# Patient Record
Sex: FEMALE | URBAN - METROPOLITAN AREA
[De-identification: names, ages, dates, MRNs, and addresses within clinical notes are randomized per-mention and may not be internally consistent; named-entity substitution may affect disease eponyms.]

---

## 2022-01-19 ENCOUNTER — INPATIENT (INPATIENT)
Facility: HOSPITAL | Age: 72
LOS: 13 days | Discharge: NURSING, REHAB OR LONG-TERM CARE | DRG: 871 | End: 2022-02-02
Attending: HOSPITALIST | Admitting: HOSPITALIST
Payer: COMMERCIAL

## 2022-01-19 VITALS
DIASTOLIC BLOOD PRESSURE: 83 MMHG | OXYGEN SATURATION: 93 % | RESPIRATION RATE: 18 BRPM | HEART RATE: 100 BPM | SYSTOLIC BLOOD PRESSURE: 121 MMHG | TEMPERATURE: 95 F

## 2022-01-19 PROCEDURE — 99291 CRITICAL CARE FIRST HOUR: CPT

## 2022-01-19 NOTE — ED PROVIDER NOTE - CARE PLAN
1 Principal Discharge DX:	COVID-19  Secondary Diagnosis:	UTI (urinary tract infection)  Secondary Diagnosis:	Thoracic aortic aneurysm

## 2022-01-19 NOTE — ED PROVIDER NOTE - PHYSICAL EXAMINATION
I have reviewed the triage vital signs.  Const: AAOx1, unkempt  Eyes: no conjunctival injection  HENT: NCAT, Neck supple, dry mucous membranes  CV: RRR, +S1, S2  Resp: CTAB, no respiratory distress  GI: Abdomen soft, NTND, no guarding  Extremities: No peripheral edema  Skin: Warm, well perfused, no rash  MSK: LLE externally rotated. BLE TTP  Neuro: No focal sensory or motor deficits  Psych: Appropriate mood and affect

## 2022-01-19 NOTE — ED PROVIDER NOTE - OBJECTIVE STATEMENT
71F transferred from [facility] for "symptomatic AAA." Pt awake, but very limited historian A&Ox1 (self). As per chart review, pt found down by HHA after being unable to contact her for 4 days. At OSH, found to have 5cm thoracic aneurysm at aortic arch, also with mild in the aschending/descending thoracic region, but study was limited due to extravasation of contrast to RUE. CT also showing BL staghorn calculus with possible hydro, given Zosyn x1, ALDA, elevated troponin, and COVID+. As per EMS, pt with VSS.

## 2022-01-19 NOTE — ED PROVIDER NOTE - PROGRESS NOTE DETAILS
Jarocho, PGY3 - Pt 92% on RA, does not appear in respiratory distress. Placed on 2L NC with improvement, will order for Decadron. Jarocho, PGY3 - CT reviewed by radiologist resident/attg, states no significant hydro, no staghorn calculi visualized (states possibly washed out by contrast, but would be intrarenal). Mildly elevated Cr. Will def urology dann d/w hospitalist who agrees

## 2022-01-19 NOTE — ED PROVIDER NOTE - CLINICAL SUMMARY MEDICAL DECISION MAKING FREE TEXT BOX
Dr. Barlow-Alba's Note: Pt transferred from OSH due to assess for possible aortic dissection - on non-con CT at osh (iv inflitrated) there is a dilated aortic root to ~6 cm, unknown if chronic vs acute. pt denying cp/sob, complaining of dizziness, but unreliable historian due to baseline dementia. on arrival pt in NAD, normotensive, HR ~100, breathing comfortably, not hypoxic, no obvious exam abnormalities. CT surgery aware of pt and recommend CTA to eval for dissection but if neg recommend admission to medicine. will also assess for any other acute medical process that may need to be treated

## 2022-01-20 DIAGNOSIS — R41.82 ALTERED MENTAL STATUS, UNSPECIFIED: ICD-10-CM

## 2022-01-20 DIAGNOSIS — E83.52 HYPERCALCEMIA: ICD-10-CM

## 2022-01-20 DIAGNOSIS — Z29.9 ENCOUNTER FOR PROPHYLACTIC MEASURES, UNSPECIFIED: ICD-10-CM

## 2022-01-20 DIAGNOSIS — I71.2 THORACIC AORTIC ANEURYSM, WITHOUT RUPTURE: ICD-10-CM

## 2022-01-20 DIAGNOSIS — N39.0 URINARY TRACT INFECTION, SITE NOT SPECIFIED: ICD-10-CM

## 2022-01-20 DIAGNOSIS — U07.1 COVID-19: ICD-10-CM

## 2022-01-20 DIAGNOSIS — R77.8 OTHER SPECIFIED ABNORMALITIES OF PLASMA PROTEINS: ICD-10-CM

## 2022-01-20 DIAGNOSIS — Z78.9 OTHER SPECIFIED HEALTH STATUS: Chronic | ICD-10-CM

## 2022-01-20 DIAGNOSIS — N17.9 ACUTE KIDNEY FAILURE, UNSPECIFIED: ICD-10-CM

## 2022-01-20 DIAGNOSIS — I71.4 ABDOMINAL AORTIC ANEURYSM, WITHOUT RUPTURE: ICD-10-CM

## 2022-01-20 LAB
ALBUMIN SERPL ELPH-MCNC: 3.8 G/DL — SIGNIFICANT CHANGE UP (ref 3.3–5)
ALBUMIN SERPL ELPH-MCNC: 4.1 G/DL — SIGNIFICANT CHANGE UP (ref 3.3–5)
ALP SERPL-CCNC: 79 U/L — SIGNIFICANT CHANGE UP (ref 40–120)
ALP SERPL-CCNC: 89 U/L — SIGNIFICANT CHANGE UP (ref 40–120)
ALT FLD-CCNC: 13 U/L — SIGNIFICANT CHANGE UP (ref 10–45)
ALT FLD-CCNC: 14 U/L — SIGNIFICANT CHANGE UP (ref 10–45)
ANION GAP SERPL CALC-SCNC: 11 MMOL/L — SIGNIFICANT CHANGE UP (ref 5–17)
ANION GAP SERPL CALC-SCNC: 17 MMOL/L — SIGNIFICANT CHANGE UP (ref 5–17)
APPEARANCE UR: ABNORMAL
APTT BLD: 25.5 SEC — LOW (ref 27.5–35.5)
AST SERPL-CCNC: 27 U/L — SIGNIFICANT CHANGE UP (ref 10–40)
AST SERPL-CCNC: 33 U/L — SIGNIFICANT CHANGE UP (ref 10–40)
BACTERIA # UR AUTO: ABNORMAL
BASE EXCESS BLDV CALC-SCNC: 1.5 MMOL/L — SIGNIFICANT CHANGE UP (ref -2–2)
BASE EXCESS BLDV CALC-SCNC: 4.9 MMOL/L — HIGH (ref -2–2)
BASOPHILS # BLD AUTO: 0.02 K/UL — SIGNIFICANT CHANGE UP (ref 0–0.2)
BASOPHILS # BLD AUTO: 0.03 K/UL — SIGNIFICANT CHANGE UP (ref 0–0.2)
BASOPHILS NFR BLD AUTO: 0.2 % — SIGNIFICANT CHANGE UP (ref 0–2)
BASOPHILS NFR BLD AUTO: 0.3 % — SIGNIFICANT CHANGE UP (ref 0–2)
BILIRUB SERPL-MCNC: 0.3 MG/DL — SIGNIFICANT CHANGE UP (ref 0.2–1.2)
BILIRUB SERPL-MCNC: 0.4 MG/DL — SIGNIFICANT CHANGE UP (ref 0.2–1.2)
BILIRUB UR-MCNC: NEGATIVE — SIGNIFICANT CHANGE UP
BUN SERPL-MCNC: 55 MG/DL — HIGH (ref 7–23)
BUN SERPL-MCNC: 59 MG/DL — HIGH (ref 7–23)
CA-I SERPL-SCNC: 1.51 MMOL/L — HIGH (ref 1.15–1.33)
CA-I SERPL-SCNC: 1.53 MMOL/L — HIGH (ref 1.15–1.33)
CALCIUM SERPL-MCNC: 11.4 MG/DL — HIGH (ref 8.4–10.5)
CALCIUM SERPL-MCNC: 12 MG/DL — HIGH (ref 8.4–10.5)
CHLORIDE BLDV-SCNC: 101 MMOL/L — SIGNIFICANT CHANGE UP (ref 96–108)
CHLORIDE BLDV-SCNC: 104 MMOL/L — SIGNIFICANT CHANGE UP (ref 96–108)
CHLORIDE SERPL-SCNC: 100 MMOL/L — SIGNIFICANT CHANGE UP (ref 96–108)
CHLORIDE SERPL-SCNC: 103 MMOL/L — SIGNIFICANT CHANGE UP (ref 96–108)
CK MB BLD-MCNC: 1.2 % — SIGNIFICANT CHANGE UP (ref 0–3.5)
CK MB CFR SERPL CALC: 6.4 NG/ML — HIGH (ref 0–3.8)
CK SERPL-CCNC: 531 U/L — HIGH (ref 25–170)
CO2 BLDV-SCNC: 31 MMOL/L — HIGH (ref 22–26)
CO2 BLDV-SCNC: 34 MMOL/L — HIGH (ref 22–26)
CO2 SERPL-SCNC: 23 MMOL/L — SIGNIFICANT CHANGE UP (ref 22–31)
CO2 SERPL-SCNC: 26 MMOL/L — SIGNIFICANT CHANGE UP (ref 22–31)
COLOR SPEC: ABNORMAL
CREAT SERPL-MCNC: 1.45 MG/DL — HIGH (ref 0.5–1.3)
CREAT SERPL-MCNC: 1.5 MG/DL — HIGH (ref 0.5–1.3)
DIFF PNL FLD: ABNORMAL
EOSINOPHIL # BLD AUTO: 0 K/UL — SIGNIFICANT CHANGE UP (ref 0–0.5)
EOSINOPHIL # BLD AUTO: 0 K/UL — SIGNIFICANT CHANGE UP (ref 0–0.5)
EOSINOPHIL NFR BLD AUTO: 0 % — SIGNIFICANT CHANGE UP (ref 0–6)
EOSINOPHIL NFR BLD AUTO: 0 % — SIGNIFICANT CHANGE UP (ref 0–6)
EPI CELLS # UR: 2 — SIGNIFICANT CHANGE UP
GAS PNL BLDV: 140 MMOL/L — SIGNIFICANT CHANGE UP (ref 136–145)
GAS PNL BLDV: 141 MMOL/L — SIGNIFICANT CHANGE UP (ref 136–145)
GAS PNL BLDV: SIGNIFICANT CHANGE UP
GLUCOSE BLDC GLUCOMTR-MCNC: 182 MG/DL — HIGH (ref 70–99)
GLUCOSE BLDC GLUCOMTR-MCNC: 219 MG/DL — HIGH (ref 70–99)
GLUCOSE BLDV-MCNC: 143 MG/DL — HIGH (ref 70–99)
GLUCOSE BLDV-MCNC: 210 MG/DL — HIGH (ref 70–99)
GLUCOSE SERPL-MCNC: 145 MG/DL — HIGH (ref 70–99)
GLUCOSE SERPL-MCNC: 200 MG/DL — HIGH (ref 70–99)
GLUCOSE UR QL: NEGATIVE — SIGNIFICANT CHANGE UP
GRAN CASTS # UR COMP ASSIST: 1 /LPF — SIGNIFICANT CHANGE UP
HCO3 BLDV-SCNC: 30 MMOL/L — HIGH (ref 22–29)
HCO3 BLDV-SCNC: 32 MMOL/L — HIGH (ref 22–29)
HCT VFR BLD CALC: 46.1 % — HIGH (ref 34.5–45)
HCT VFR BLD CALC: 48.5 % — HIGH (ref 34.5–45)
HCT VFR BLDA CALC: 44 % — SIGNIFICANT CHANGE UP (ref 34.5–46.5)
HCT VFR BLDA CALC: 45 % — SIGNIFICANT CHANGE UP (ref 34.5–46.5)
HGB BLD CALC-MCNC: 14.7 G/DL — SIGNIFICANT CHANGE UP (ref 11.7–16.1)
HGB BLD CALC-MCNC: 14.9 G/DL — SIGNIFICANT CHANGE UP (ref 11.7–16.1)
HGB BLD-MCNC: 14.5 G/DL — SIGNIFICANT CHANGE UP (ref 11.5–15.5)
HGB BLD-MCNC: 15.5 G/DL — SIGNIFICANT CHANGE UP (ref 11.5–15.5)
HYALINE CASTS # UR AUTO: 4 /LPF — SIGNIFICANT CHANGE UP (ref 0–7)
IMM GRANULOCYTES NFR BLD AUTO: 0.3 % — SIGNIFICANT CHANGE UP (ref 0–1.5)
IMM GRANULOCYTES NFR BLD AUTO: 0.3 % — SIGNIFICANT CHANGE UP (ref 0–1.5)
INR BLD: 0.97 RATIO — SIGNIFICANT CHANGE UP (ref 0.88–1.16)
KETONES UR-MCNC: NEGATIVE — SIGNIFICANT CHANGE UP
LACTATE BLDV-MCNC: 2.1 MMOL/L — HIGH (ref 0.7–2)
LACTATE BLDV-MCNC: 3.5 MMOL/L — HIGH (ref 0.7–2)
LEUKOCYTE ESTERASE UR-ACNC: ABNORMAL
LYMPHOCYTES # BLD AUTO: 1.27 K/UL — SIGNIFICANT CHANGE UP (ref 1–3.3)
LYMPHOCYTES # BLD AUTO: 1.31 K/UL — SIGNIFICANT CHANGE UP (ref 1–3.3)
LYMPHOCYTES # BLD AUTO: 13.1 % — SIGNIFICANT CHANGE UP (ref 13–44)
LYMPHOCYTES # BLD AUTO: 14 % — SIGNIFICANT CHANGE UP (ref 13–44)
MCHC RBC-ENTMCNC: 29.4 PG — SIGNIFICANT CHANGE UP (ref 27–34)
MCHC RBC-ENTMCNC: 29.5 PG — SIGNIFICANT CHANGE UP (ref 27–34)
MCHC RBC-ENTMCNC: 31.5 GM/DL — LOW (ref 32–36)
MCHC RBC-ENTMCNC: 32 GM/DL — SIGNIFICANT CHANGE UP (ref 32–36)
MCV RBC AUTO: 91.9 FL — SIGNIFICANT CHANGE UP (ref 80–100)
MCV RBC AUTO: 93.7 FL — SIGNIFICANT CHANGE UP (ref 80–100)
MONOCYTES # BLD AUTO: 0.32 K/UL — SIGNIFICANT CHANGE UP (ref 0–0.9)
MONOCYTES # BLD AUTO: 0.9 K/UL — SIGNIFICANT CHANGE UP (ref 0–0.9)
MONOCYTES NFR BLD AUTO: 3.4 % — SIGNIFICANT CHANGE UP (ref 2–14)
MONOCYTES NFR BLD AUTO: 9.3 % — SIGNIFICANT CHANGE UP (ref 2–14)
NEUTROPHILS # BLD AUTO: 7.44 K/UL — HIGH (ref 1.8–7.4)
NEUTROPHILS # BLD AUTO: 7.71 K/UL — HIGH (ref 1.8–7.4)
NEUTROPHILS NFR BLD AUTO: 77 % — SIGNIFICANT CHANGE UP (ref 43–77)
NEUTROPHILS NFR BLD AUTO: 82.1 % — HIGH (ref 43–77)
NITRITE UR-MCNC: POSITIVE
NRBC # BLD: 0 /100 WBCS — SIGNIFICANT CHANGE UP (ref 0–0)
NRBC # BLD: 0 /100 WBCS — SIGNIFICANT CHANGE UP (ref 0–0)
NT-PROBNP SERPL-SCNC: 1114 PG/ML — HIGH (ref 0–300)
PCO2 BLDV: 57 MMHG — HIGH (ref 39–42)
PCO2 BLDV: 60 MMHG — HIGH (ref 39–42)
PH BLDV: 7.3 — LOW (ref 7.32–7.43)
PH BLDV: 7.36 — SIGNIFICANT CHANGE UP (ref 7.32–7.43)
PH UR: 6 — SIGNIFICANT CHANGE UP (ref 5–8)
PLATELET # BLD AUTO: 228 K/UL — SIGNIFICANT CHANGE UP (ref 150–400)
PLATELET # BLD AUTO: 231 K/UL — SIGNIFICANT CHANGE UP (ref 150–400)
PO2 BLDV: 32 MMHG — SIGNIFICANT CHANGE UP (ref 25–45)
PO2 BLDV: 33 MMHG — SIGNIFICANT CHANGE UP (ref 25–45)
POTASSIUM BLDV-SCNC: 3.2 MMOL/L — LOW (ref 3.5–5.1)
POTASSIUM BLDV-SCNC: 3.5 MMOL/L — SIGNIFICANT CHANGE UP (ref 3.5–5.1)
POTASSIUM SERPL-MCNC: 3.3 MMOL/L — LOW (ref 3.5–5.3)
POTASSIUM SERPL-MCNC: 3.6 MMOL/L — SIGNIFICANT CHANGE UP (ref 3.5–5.3)
POTASSIUM SERPL-SCNC: 3.3 MMOL/L — LOW (ref 3.5–5.3)
POTASSIUM SERPL-SCNC: 3.6 MMOL/L — SIGNIFICANT CHANGE UP (ref 3.5–5.3)
PROT SERPL-MCNC: 7.7 G/DL — SIGNIFICANT CHANGE UP (ref 6–8.3)
PROT SERPL-MCNC: 8.5 G/DL — HIGH (ref 6–8.3)
PROT UR-MCNC: 100 — SIGNIFICANT CHANGE UP
PROTHROM AB SERPL-ACNC: 11.7 SEC — SIGNIFICANT CHANGE UP (ref 10.6–13.6)
RBC # BLD: 4.92 M/UL — SIGNIFICANT CHANGE UP (ref 3.8–5.2)
RBC # BLD: 5.28 M/UL — HIGH (ref 3.8–5.2)
RBC # FLD: 14.6 % — HIGH (ref 10.3–14.5)
RBC # FLD: 14.7 % — HIGH (ref 10.3–14.5)
RBC CASTS # UR COMP ASSIST: 8 /HPF — HIGH (ref 0–4)
SAO2 % BLDV: 47.1 % — LOW (ref 67–88)
SAO2 % BLDV: 49.9 % — LOW (ref 67–88)
SARS-COV-2 RNA SPEC QL NAA+PROBE: DETECTED
SODIUM SERPL-SCNC: 140 MMOL/L — SIGNIFICANT CHANGE UP (ref 135–145)
SODIUM SERPL-SCNC: 140 MMOL/L — SIGNIFICANT CHANGE UP (ref 135–145)
SP GR SPEC: 1.04 — HIGH (ref 1.01–1.02)
TROPONIN T, HIGH SENSITIVITY RESULT: 107 NG/L — HIGH (ref 0–51)
TROPONIN T, HIGH SENSITIVITY RESULT: 93 NG/L — HIGH (ref 0–51)
UROBILINOGEN FLD QL: NEGATIVE — SIGNIFICANT CHANGE UP
WBC # BLD: 9.39 K/UL — SIGNIFICANT CHANGE UP (ref 3.8–10.5)
WBC # BLD: 9.67 K/UL — SIGNIFICANT CHANGE UP (ref 3.8–10.5)
WBC # FLD AUTO: 9.39 K/UL — SIGNIFICANT CHANGE UP (ref 3.8–10.5)
WBC # FLD AUTO: 9.67 K/UL — SIGNIFICANT CHANGE UP (ref 3.8–10.5)
WBC UR QL: >50 /HPF — HIGH (ref 0–5)

## 2022-01-20 PROCEDURE — 74174 CTA ABD&PLVS W/CONTRAST: CPT | Mod: 26

## 2022-01-20 PROCEDURE — 73630 X-RAY EXAM OF FOOT: CPT | Mod: 26,RT

## 2022-01-20 PROCEDURE — 99223 1ST HOSP IP/OBS HIGH 75: CPT

## 2022-01-20 PROCEDURE — 71275 CT ANGIOGRAPHY CHEST: CPT | Mod: 26

## 2022-01-20 PROCEDURE — 72170 X-RAY EXAM OF PELVIS: CPT | Mod: 26

## 2022-01-20 PROCEDURE — 12345: CPT | Mod: NC

## 2022-01-20 PROCEDURE — 73552 X-RAY EXAM OF FEMUR 2/>: CPT | Mod: 26,LT

## 2022-01-20 RX ORDER — GLUCAGON INJECTION, SOLUTION 0.5 MG/.1ML
1 INJECTION, SOLUTION SUBCUTANEOUS ONCE
Refills: 0 | Status: DISCONTINUED | OUTPATIENT
Start: 2022-01-20 | End: 2022-02-02

## 2022-01-20 RX ORDER — LABETALOL HCL 100 MG
100 TABLET ORAL
Refills: 0 | Status: DISCONTINUED | OUTPATIENT
Start: 2022-01-20 | End: 2022-01-20

## 2022-01-20 RX ORDER — AMLODIPINE BESYLATE 2.5 MG/1
5 TABLET ORAL ONCE
Refills: 0 | Status: COMPLETED | OUTPATIENT
Start: 2022-01-20 | End: 2022-01-20

## 2022-01-20 RX ORDER — REMDESIVIR 5 MG/ML
INJECTION INTRAVENOUS
Refills: 0 | Status: COMPLETED | OUTPATIENT
Start: 2022-01-20 | End: 2022-01-24

## 2022-01-20 RX ORDER — DEXAMETHASONE 0.5 MG/5ML
6 ELIXIR ORAL DAILY
Refills: 0 | Status: DISCONTINUED | OUTPATIENT
Start: 2022-01-20 | End: 2022-01-20

## 2022-01-20 RX ORDER — PIPERACILLIN AND TAZOBACTAM 4; .5 G/20ML; G/20ML
3.38 INJECTION, POWDER, LYOPHILIZED, FOR SOLUTION INTRAVENOUS ONCE
Refills: 0 | Status: COMPLETED | OUTPATIENT
Start: 2022-01-20 | End: 2022-01-20

## 2022-01-20 RX ORDER — SODIUM CHLORIDE 9 MG/ML
1000 INJECTION INTRAMUSCULAR; INTRAVENOUS; SUBCUTANEOUS ONCE
Refills: 0 | Status: COMPLETED | OUTPATIENT
Start: 2022-01-20 | End: 2022-01-20

## 2022-01-20 RX ORDER — LABETALOL HCL 100 MG
100 TABLET ORAL THREE TIMES A DAY
Refills: 0 | Status: DISCONTINUED | OUTPATIENT
Start: 2022-01-20 | End: 2022-01-20

## 2022-01-20 RX ORDER — REMDESIVIR 5 MG/ML
100 INJECTION INTRAVENOUS EVERY 24 HOURS
Refills: 0 | Status: COMPLETED | OUTPATIENT
Start: 2022-01-21 | End: 2022-01-24

## 2022-01-20 RX ORDER — SODIUM CHLORIDE 9 MG/ML
1000 INJECTION, SOLUTION INTRAVENOUS
Refills: 0 | Status: DISCONTINUED | OUTPATIENT
Start: 2022-01-20 | End: 2022-02-02

## 2022-01-20 RX ORDER — LABETALOL HCL 100 MG
100 TABLET ORAL THREE TIMES A DAY
Refills: 0 | Status: DISCONTINUED | OUTPATIENT
Start: 2022-01-20 | End: 2022-01-26

## 2022-01-20 RX ORDER — HEPARIN SODIUM 5000 [USP'U]/ML
5000 INJECTION INTRAVENOUS; SUBCUTANEOUS EVERY 8 HOURS
Refills: 0 | Status: DISCONTINUED | OUTPATIENT
Start: 2022-01-20 | End: 2022-01-21

## 2022-01-20 RX ORDER — ACETAMINOPHEN 500 MG
650 TABLET ORAL EVERY 6 HOURS
Refills: 0 | Status: DISCONTINUED | OUTPATIENT
Start: 2022-01-20 | End: 2022-02-02

## 2022-01-20 RX ORDER — SODIUM CHLORIDE 9 MG/ML
1000 INJECTION INTRAMUSCULAR; INTRAVENOUS; SUBCUTANEOUS
Refills: 0 | Status: DISCONTINUED | OUTPATIENT
Start: 2022-01-20 | End: 2022-01-21

## 2022-01-20 RX ORDER — DEXTROSE 50 % IN WATER 50 %
15 SYRINGE (ML) INTRAVENOUS ONCE
Refills: 0 | Status: DISCONTINUED | OUTPATIENT
Start: 2022-01-20 | End: 2022-02-02

## 2022-01-20 RX ORDER — POTASSIUM CHLORIDE 20 MEQ
10 PACKET (EA) ORAL
Refills: 0 | Status: COMPLETED | OUTPATIENT
Start: 2022-01-20 | End: 2022-01-20

## 2022-01-20 RX ORDER — ACETAMINOPHEN 500 MG
1000 TABLET ORAL ONCE
Refills: 0 | Status: COMPLETED | OUTPATIENT
Start: 2022-01-20 | End: 2022-01-20

## 2022-01-20 RX ORDER — LABETALOL HCL 100 MG
10 TABLET ORAL ONCE
Refills: 0 | Status: COMPLETED | OUTPATIENT
Start: 2022-01-20 | End: 2022-01-20

## 2022-01-20 RX ORDER — ACETAMINOPHEN 500 MG
650 TABLET ORAL EVERY 4 HOURS
Refills: 0 | Status: DISCONTINUED | OUTPATIENT
Start: 2022-01-20 | End: 2022-02-02

## 2022-01-20 RX ORDER — REMDESIVIR 5 MG/ML
200 INJECTION INTRAVENOUS EVERY 24 HOURS
Refills: 0 | Status: COMPLETED | OUTPATIENT
Start: 2022-01-20 | End: 2022-01-20

## 2022-01-20 RX ORDER — DEXTROSE 50 % IN WATER 50 %
25 SYRINGE (ML) INTRAVENOUS ONCE
Refills: 0 | Status: DISCONTINUED | OUTPATIENT
Start: 2022-01-20 | End: 2022-02-02

## 2022-01-20 RX ORDER — AMLODIPINE BESYLATE 2.5 MG/1
5 TABLET ORAL DAILY
Refills: 0 | Status: DISCONTINUED | OUTPATIENT
Start: 2022-01-20 | End: 2022-01-20

## 2022-01-20 RX ORDER — DEXAMETHASONE 0.5 MG/5ML
6 ELIXIR ORAL ONCE
Refills: 0 | Status: COMPLETED | OUTPATIENT
Start: 2022-01-20 | End: 2022-01-20

## 2022-01-20 RX ORDER — GUAIFENESIN/DEXTROMETHORPHAN 600MG-30MG
10 TABLET, EXTENDED RELEASE 12 HR ORAL EVERY 4 HOURS
Refills: 0 | Status: DISCONTINUED | OUTPATIENT
Start: 2022-01-20 | End: 2022-02-02

## 2022-01-20 RX ORDER — CEFTRIAXONE 500 MG/1
1000 INJECTION, POWDER, FOR SOLUTION INTRAMUSCULAR; INTRAVENOUS EVERY 24 HOURS
Refills: 0 | Status: DISCONTINUED | OUTPATIENT
Start: 2022-01-20 | End: 2022-01-21

## 2022-01-20 RX ORDER — SODIUM CHLORIDE 9 MG/ML
1000 INJECTION, SOLUTION INTRAVENOUS
Refills: 0 | Status: DISCONTINUED | OUTPATIENT
Start: 2022-01-20 | End: 2022-01-21

## 2022-01-20 RX ORDER — ALBUTEROL 90 UG/1
2 AEROSOL, METERED ORAL EVERY 4 HOURS
Refills: 0 | Status: DISCONTINUED | OUTPATIENT
Start: 2022-01-20 | End: 2022-02-02

## 2022-01-20 RX ORDER — AMLODIPINE BESYLATE 2.5 MG/1
10 TABLET ORAL DAILY
Refills: 0 | Status: DISCONTINUED | OUTPATIENT
Start: 2022-01-21 | End: 2022-02-02

## 2022-01-20 RX ORDER — INSULIN LISPRO 100/ML
VIAL (ML) SUBCUTANEOUS
Refills: 0 | Status: DISCONTINUED | OUTPATIENT
Start: 2022-01-20 | End: 2022-02-02

## 2022-01-20 RX ORDER — ONDANSETRON 8 MG/1
4 TABLET, FILM COATED ORAL EVERY 8 HOURS
Refills: 0 | Status: DISCONTINUED | OUTPATIENT
Start: 2022-01-20 | End: 2022-02-02

## 2022-01-20 RX ORDER — NICOTINE POLACRILEX 2 MG
1 GUM BUCCAL DAILY
Refills: 0 | Status: DISCONTINUED | OUTPATIENT
Start: 2022-01-20 | End: 2022-02-02

## 2022-01-20 RX ORDER — LANOLIN ALCOHOL/MO/W.PET/CERES
3 CREAM (GRAM) TOPICAL AT BEDTIME
Refills: 0 | Status: DISCONTINUED | OUTPATIENT
Start: 2022-01-20 | End: 2022-02-02

## 2022-01-20 RX ADMIN — Medication 6 MILLIGRAM(S): at 13:33

## 2022-01-20 RX ADMIN — Medication 650 MILLIGRAM(S): at 22:20

## 2022-01-20 RX ADMIN — SODIUM CHLORIDE 200 MILLILITER(S): 9 INJECTION INTRAMUSCULAR; INTRAVENOUS; SUBCUTANEOUS at 08:30

## 2022-01-20 RX ADMIN — SODIUM CHLORIDE 1000 MILLILITER(S): 9 INJECTION INTRAMUSCULAR; INTRAVENOUS; SUBCUTANEOUS at 04:48

## 2022-01-20 RX ADMIN — Medication 100 MILLIEQUIVALENT(S): at 13:34

## 2022-01-20 RX ADMIN — HEPARIN SODIUM 5000 UNIT(S): 5000 INJECTION INTRAVENOUS; SUBCUTANEOUS at 15:04

## 2022-01-20 RX ADMIN — Medication 10 MILLIGRAM(S): at 04:45

## 2022-01-20 RX ADMIN — SODIUM CHLORIDE 50 MILLILITER(S): 9 INJECTION, SOLUTION INTRAVENOUS at 16:23

## 2022-01-20 RX ADMIN — Medication 100 MILLIEQUIVALENT(S): at 15:05

## 2022-01-20 RX ADMIN — Medication 1: at 18:25

## 2022-01-20 RX ADMIN — Medication 1000 MILLIGRAM(S): at 04:47

## 2022-01-20 RX ADMIN — REMDESIVIR 200 MILLIGRAM(S): 5 INJECTION INTRAVENOUS at 12:58

## 2022-01-20 RX ADMIN — AMLODIPINE BESYLATE 5 MILLIGRAM(S): 2.5 TABLET ORAL at 12:22

## 2022-01-20 RX ADMIN — PIPERACILLIN AND TAZOBACTAM 25 GRAM(S): 4; .5 INJECTION, POWDER, LYOPHILIZED, FOR SOLUTION INTRAVENOUS at 06:05

## 2022-01-20 RX ADMIN — Medication 6 MILLIGRAM(S): at 01:16

## 2022-01-20 RX ADMIN — AMLODIPINE BESYLATE 5 MILLIGRAM(S): 2.5 TABLET ORAL at 16:23

## 2022-01-20 RX ADMIN — Medication 650 MILLIGRAM(S): at 23:00

## 2022-01-20 RX ADMIN — CEFTRIAXONE 100 MILLIGRAM(S): 500 INJECTION, POWDER, FOR SOLUTION INTRAMUSCULAR; INTRAVENOUS at 12:22

## 2022-01-20 RX ADMIN — HEPARIN SODIUM 5000 UNIT(S): 5000 INJECTION INTRAVENOUS; SUBCUTANEOUS at 21:19

## 2022-01-20 RX ADMIN — Medication 100 MILLIGRAM(S): at 21:19

## 2022-01-20 RX ADMIN — Medication 100 MILLIEQUIVALENT(S): at 12:22

## 2022-01-20 RX ADMIN — Medication 400 MILLIGRAM(S): at 04:17

## 2022-01-20 RX ADMIN — Medication 2: at 12:23

## 2022-01-20 NOTE — ED ADULT NURSE NOTE - NSSEPSISSUSPECTED_ED_A_ED
No retinal holes or tears seen on exam. Recommended observation. We reviewed the signs and symptoms of retinal tear/retinal detachment and the importance of prompt evaluation should there be increasing floaters, new flashing lights, or decreasing peripheral vision in either eye at any time. Patient understands condition, prognosis and need for follow up care. No

## 2022-01-20 NOTE — H&P ADULT - PROBLEM SELECTOR PLAN 1
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

## 2022-01-20 NOTE — PHYSICAL THERAPY INITIAL EVALUATION ADULT - ACTIVE RANGE OF MOTION EXAMINATION, REHAB EVAL
left UE contracted/bilateral upper extremity Active ROM was WFL (within functional limits)/bilateral  lower extremity Active ROM was WFL (within functional limits)

## 2022-01-20 NOTE — H&P ADULT - PROBLEM SELECTOR PLAN 2
Partially thrombosed infrarenal abdominal aortic aneurysm measures up   to 3.4 cm.  -s/p IVC filter per CT imaging therefore thrombosis seems known  -will try to obtain outside records

## 2022-01-20 NOTE — H&P ADULT - PROBLEM SELECTOR PLAN 3
r/o infectious etiology vs Hypercalcemia vs thyroid disease  -c/w ceftriaxone 1g qd  -f/u BCx and BCx  -IV hydration for hypercalcemia  -f/u thyroid studies  -f/u vitamin B12

## 2022-01-20 NOTE — PHARMACOTHERAPY INTERVENTION NOTE - COMMENTS
Performed medication reconciliation and home medication list updated in outpatient medication review. Medications and information regarding patient collected from:  1) Joel Kim: Pt's sister; Vietnamese speaking only.   2) Zeferino: Pt's nephew (Joel's son); visits pt most often and is most familiar with her care  3) Phoebe Parada: Pt's other nephew's wife    Pt lives alone, denies home care in the past. Pt's nephew (Zeferino) and other family members splits responsibilities and visits patients and takes care of her. Per family, pt has history of DM, HTN, ?CKD - takes metformin, family members acknowledges pt is on other medications for BP, but cannot recall names of medications or doses/frequencies.  Pt is also a heavy smoker (2 ppd). Per family, pt has hx of non-compliance to medications/ "does not take any medications" and does not seek any medical care for her conditions (no PCP). Pharmacy is [facility] on [address]. Per pharmacy, no medications on file except for toenail fungal medications in 2020 and they were not picked up. Information relayed to MARII Clark.    Please refer to specifics in home medication list (outpatient medication review).    Tish Henderson, PharmD, [facility]  Clinical Pharmacy Specialist  [phone] or Teams Performed medication reconciliation and home medication list updated in outpatient medication review. Medications and information regarding patient collected from:  1) Joel Kim: Pt's sister; Maltese speaking only.   2) Zeferino: Pt's nephew (Joel's son); visits pt most often and is most familiar with her care  3) Phoebe Parada: Pt's other nephew's wife    Pt lives alone, denies home care in the past. Pt's nephew (Zeferino) and other family members splits responsibilities and visits patient and takes care of her. Per family, pt has history of DM, HTN, ?CKD - takes metformin, family members acknowledges pt is on other medications for BP, but cannot recall names of medications or doses/frequencies.  Pt is also a heavy smoker (2 ppd). Per family, pt has hx of non-compliance to medications/ "does not take any medications" and does not seek any medical care for her conditions (no PCP). Pharmacy is [facility] on [address]. Per pharmacy, no medications on file except for toenail fungal medications in 2020 and they were not picked up. Information relayed to MARII Clark.    Please refer to specifics in home medication list (outpatient medication review).    Tish Henderson, PharmD, [facility]  Clinical Pharmacy Specialist  [phone] or Teams Performed medication reconciliation and home medication list updated in outpatient medication review. Medications and information regarding patient collected from:  1) Joel Kim: Pt's sister; Divehi speaking only.   2) Zeferino: Pt's nephew (Joel's son); visits pt most often and is most familiar with her care - [phone]  3) Phoebe Parada: Pt's other nephew's wife - [phone]    Pt lives alone, denies home care in the past. Pt's nephew (Zeferino) and other family members splits responsibilities and visits patient and takes care of her. Per family, pt has history of DM, HTN, ?CKD - takes metformin, family members acknowledges pt is on other medications for BP, but cannot recall names of medications or doses/frequencies.  Pt is also a heavy smoker (2 ppd). Per family, pt has hx of non-compliance to medications/ "does not take any medications" and does not seek any medical care for her conditions (no PCP). Pharmacy is [facility] on [address]. Per pharmacy, no medications on file except for toenail fungal medications in 2020 and they were not picked up. Information relayed to MARII Clark.    Please refer to specifics in home medication list (outpatient medication review).    Tish Henderson, PharmD, [facility]  Clinical Pharmacy Specialist  [phone] or Teams

## 2022-01-20 NOTE — ED ADULT NURSE NOTE - OBJECTIVE STATEMENT
patient came during a sunrise down time. See paper record for additional documentation used during this time. patient came during a sunrise down time. See paper record for additional documentation used during this time.  71 year old female transferred from [facility] falling a fall. Patient is being transferred for a AAA that was found at the OSH. As per EMS patient was brought to the OSH following a fall at home in the bathroom. Patient has profound dementia and is confused at baseline. On arrival the patient is awake, but very poor historian A&Ox1 (self). As per chart review, pt found down by HHA after being unable to contact her for 4 days. At OSH, found to have 5cm thoracic aneurysm at aortic arch, also with mild in the aschending/descending thoracic region, but study was limited due to extravasation of contrast to RUE. CT also showing BL staghorn calculus with possible hydro, given Zosyn x1, ALDA, elevated troponin, and COVID+.

## 2022-01-20 NOTE — ED ADULT NURSE REASSESSMENT NOTE - NS ED NURSE REASSESS COMMENT FT1
Pt straight cath for sterile urine specimen sample. Procedure explained to patient, pt verbalized understanding. Sterile technique used, two RNs at bedside to confirm. No resistance felt, no pain noted from patient, no blood on output. About 200cc of urine output. Catheter removed with out pain or resistance.

## 2022-01-20 NOTE — PHYSICAL THERAPY INITIAL EVALUATION ADULT - ADDITIONAL COMMENTS
Pt lives at home by herself and has home aides per ED note, unable to obtain rest of SH Info obtianed from nephew via phone - [phone] Zeferino. Pt lives alone in an elevator apartment w/ no steps to enter. Pt has a family friend who comes by episodically to assist. PTA pt amb w/ st cane indep upto a blk distance prior to fatigue. Info obtianed from nephew via phone - [phone] Zeferino. Pt lives alone in an elevator apartment w/ no steps to enter. Pt has a family friend who comes by episodically to assist. PTA pt amb w/ st cane indep upto a blk distance prior to fatigue. CT angio chest: no aortic dissection    social history: pt lives alone in apartment, ambulates with straight cane

## 2022-01-20 NOTE — H&P ADULT - ASSESSMENT
71F with unknown PMH  transferred from [facility] for "symptomatic AAA."On admission here found to have thoracic aortic aneurysm, UTI and COVID      IMPRESSION:  *  No aortic dissection.  *  Partially thrombosed infrarenal abdominal aortic aneurysm measures up   to 3.4 cm.  *  Patchy groundglass opacity left lower lobe, likely infectious in   etiology. Follow-up suggested until resolution to exclude underlying   neoplasm.  *  IVC filter with struts outside of the lumen.  No intramural hematoma or aortic dissection. Bovine arch, a   normal variant. The thoracic aorta measures up to 5 cm at the level of   the arch. Carotid artery calcifications.   71F with unknown PMH  transferred from [facility] for "symptomatic AAA."On admission here found to have thoracic aortic aneurysm, UTI and COVID

## 2022-01-20 NOTE — CONSULT NOTE ADULT - ASSESSMENT
71 yr female hx of dementia, HTN, now sent from Outside hospital for thoracic aneurysm.     - Aortic aneurysm   - HTN  - renal insuff  - UTI    -pt does not need emergent surgical intervention at present. pt has a accidental finding of Ascending arch aneurysm , and Abd Aortic 3.4 aneurysm with thrombosis of aneurysm.   - Medicine admission .   - pt no need for ICU admission , but need tight BP control. HR is > 80 , sbp >140 , would start low dose Labetalol. At present she is not a candidate for ACE due to elevated Creatine.   - IV hydration , pt on CT scan with LVH, and UTI on labs.  - would check ECHO  - serial Cardiac markers   - ABX for + uti   - will follow with medicine for further intervention   D/W ER team,   D/W Dr Kumar  71 yr female hx of dementia, HTN, now sent from Outside hospital for thoracic aneurysm.     - Aortic aneurysm   - HTN  - renal insuff  - UTI  - COVID +     -pt does not need emergent surgical intervention at present. pt has a accidental finding of Ascending arch aneurysm , and Abd Aortic 3.4 aneurysm with thrombosis of aneurysm.   - Medicine admission .   - pt no need for ICU admission , but need tight BP control. HR is > 80 , sbp >140 , would start low dose Labetalol. At present she is not a candidate for ACE due to elevated Creatine.   - IV hydration , pt on CT scan with LVH, and UTI on labs.  - would check ECHO  - serial Cardiac markers   - ABX for + uti   - COVID management  per medicine   - will follow with medicine for further intervention     D/W ER team,   D/W Dr Kumar

## 2022-01-20 NOTE — CONSULT NOTE ADULT - SUBJECTIVE AND OBJECTIVE BOX
71F transferred from [facility] for "symptomatic AAA." Pt awake, but very limited historian A&Ox1 (self). As per chart review, pt found down by HHA after being unable to contact her for 4 days. At OSH, found to have 5cm thoracic aneurysm at aortic arch, also with mild in the aschending/descending thoracic region, but study was limited due to extravasation of contrast to RUE. CT also showing BL staghorn calculus with possible hydro, given Zosyn x1, ALDA, elevated troponin, and COVID+.    Events last 24 hours: *** Pt transferred from Outside hospital, seen in ER , pt denies chest pain , sob , palpitation on assessment.   Pt has a ct non-contrast of chest, dilated ascending aorta, unable to assess if a dissection is present. /76 , HR 80 NSR on tele.   pt for urgent ct scan with IV contrast to evaluate Aorta .     PAST MEDICAL & SURGICAL HISTORY:    Allergies    Allergy Status Unknown    Intolerances      FAMILY HISTORY:      Review of Systems:  CONSTITUTIONAL: No fever, chills, or fatigue  EYES: No eye pain, visual disturbances, or discharge  ENMT:  No difficulty hearing, tinnitus, vertigo; No sinus or throat pain  NECK: No pain or stiffness  RESPIRATORY: No cough, wheezing, chills or hemoptysis; No shortness of breath  CARDIOVASCULAR: No chest pain, palpitations, dizziness, or leg swelling  GASTROINTESTINAL: No abdominal or epigastric pain. No nausea, vomiting, or hematemesis; No diarrhea or constipation. No melena or hematochezia.  GENITOURINARY: No dysuria, frequency, hematuria, or incontinence  NEUROLOGICAL: No headaches, memory loss, loss of strength, numbness, or tremors  SKIN: No itching, burning, rashes, or lesions   MUSCULOSKELETAL: No joint pain or swelling; No muscle, back, or extremity pain  PSYCHIATRIC: No depression, anxiety, mood swings, or difficulty sleeping      Medications:        acetaminophen   IVPB .. 1000 milliGRAM(s) IV Intermittent once      ICU Vital Signs Last 24 Hrs  T(C): 38.2 (2022 01:15), Max: 38.2 (2022 01:15)  T(F): 100.8 (2022 01:15), Max: 100.8 (2022 01:15)  HR: 100 (2022 23:48) (100 - 100)  BP: 121/83 (2022 23:48) (121/83 - 121/83)  BP(mean): --  ABP: --  ABP(mean): --  RR: 18 (2022 23:48) (18 - 18)  SpO2: 93% (2022 23:48) (93% - 93%)    Vital Signs Last 24 Hrs  T(C): 38.2 (2022 01:15), Max: 38.2 (2022 01:15)  T(F): 100.8 (2022 01:15), Max: 100.8 (2022 01:15)  HR: 100 (2022 23:48) (100 - 100)  BP: 121/83 (2022 23:48) (121/83 - 121/83)  BP(mean): --  RR: 18 (2022 23:48) (18 - 18)  SpO2: 93% (2022 23:48) (93% - 93%)        I&O's Detail        LABS:                        15.5   9.67  )-----------( 228      ( :18 )             48.5     01-    140  |  100  |  55<H>  ----------------------------<  145<H>  3.6   |  23  |  1.45<H>    Ca    12.0<H>      :18    TPro  8.5<H>  /  Alb  4.1  /  TBili  0.4  /  DBili  x   /  AST  33  /  ALT  14  /  AlkPhos  89  01-20          CAPILLARY BLOOD GLUCOSE        PT/INR - ( :18 )   PT: 11.7 sec;   INR: 0.97 ratio         PTT - ( :18 )  PTT:25.5 sec  Urinalysis Basic - ( 2022 01:16 )    Color: Light Orange / Appearance: Turbid / S.039 / pH: x  Gluc: x / Ketone: Negative  / Bili: Negative / Urobili: Negative   Blood: x / Protein: 100 / Nitrite: Positive   Leuk Esterase: Large / RBC: 8 /hpf / WBC >50 /HPF   Sq Epi: x / Non Sq Epi: 2 / Bacteria: Many      CULTURES:      Physical Examination:    General: No acute distress.  Alert, oriented, interactive, nonfocal    HEENT: Pupils equal, reactive to light.  Symmetric.    PULM: Clear to auscultation bilaterally, no significant sputum production    CVS: Regular rate and rhythm, no murmurs, rubs, or gallops    ABD: Soft, nondistended, nontender, normoactive bowel sounds, no masses    EXT: No edema, nontender, no wounds .  + equal pulses of ki lower ext.     SKIN: Warm and well perfused, no rashes noted.    RADIOLOGY: ***  CT chest with IV contrast - Prelim:   Ascending Aorta Aneurysm 5.0 cm, with no dissection . Abd Aorta with 3.4 cm aneurysm with ? thrombosis of aneurysm.

## 2022-01-20 NOTE — PHYSICAL THERAPY INITIAL EVALUATION ADULT - PRECAUTIONS/LIMITATIONS, REHAB EVAL
CT also showing BL staghorn calculus with possible hydro, given Zosyn x1, ALDA, elevated troponin, and COVID+. Pt seen by CT surgery in ED who deemed pt not needing emergent surgical intervention at this time. Imaging for pelvis, femur, ankle (-) for fracture.

## 2022-01-20 NOTE — PHYSICAL THERAPY INITIAL EVALUATION ADULT - DISCHARGE DISPOSITION, PT EVAL
Subacute Rehab, if pt to go home, will require home PT and assistance for all functional mobility/activities/rehabilitation facility

## 2022-01-20 NOTE — H&P ADULT - NSHPPHYSICALEXAM_GEN_ALL_CORE
Vital Signs Last 24 Hrs  T(C): 36.8 (20 Jan 2022 08:27), Max: 38.2 (20 Jan 2022 01:15)  T(F): 98.2 (20 Jan 2022 08:27), Max: 100.8 (20 Jan 2022 01:15)  HR: 63 (20 Jan 2022 08:27) (62 - 100)  BP: 169/75 (20 Jan 2022 08:27) (121/83 - 169/75)  BP(mean): 111 (20 Jan 2022 01:15) (111 - 111)  RR: 18 (20 Jan 2022 08:27) (17 - 18)  SpO2: 92% (20 Jan 2022 08:27) (91% - 97%)

## 2022-01-20 NOTE — H&P ADULT - HISTORY OF PRESENT ILLNESS
71F with unknown PMH  transferred from [facility] for "symptomatic AAA." Unable to obtain history from pt has AAO x1 and no point of contact in chart. Most of history per ED note. Per ED note, pt found down by HHA after being unable to contact her for 4 days. At OSH, found to have 5cm thoracic aneurysm at aortic arch, also with mild in the aschending/descending thoracic region, but study was limited due to extravasation of contrast to RUE. CT also showing BL staghorn calculus with possible hydro, given Zosyn x1, ALDA, elevated troponin, and COVID+.    ED course: Hypoxic to 91% on RA. Zosyn, dex, labetalol given,     Pt seen by CT surgery in ED who deemed pt not needing emergent surgical intervention at this time

## 2022-01-20 NOTE — PHYSICAL THERAPY INITIAL EVALUATION ADULT - PERTINENT HX OF CURRENT PROBLEM, REHAB EVAL
71F transferred from [facility] for "symptomatic AAA." Pt awake, but very limited historian A&Ox1 (self). As per chart review, pt found down by HHA after being unable to contact her for 4 days. At OSH, found to have 5cm thoracic aneurysm at aortic arch, also with mild in the aschending/descending thoracic region, but study was limited due to extravasation of contrast to RUE. to be continued

## 2022-01-21 DIAGNOSIS — I63.9 CEREBRAL INFARCTION, UNSPECIFIED: ICD-10-CM

## 2022-01-21 DIAGNOSIS — U07.1 COVID-19: ICD-10-CM

## 2022-01-21 LAB
A1C WITH ESTIMATED AVERAGE GLUCOSE RESULT: 9.2 % — HIGH (ref 4–5.6)
ALBUMIN SERPL ELPH-MCNC: 3.6 G/DL — SIGNIFICANT CHANGE UP (ref 3.3–5)
ALP SERPL-CCNC: 70 U/L — SIGNIFICANT CHANGE UP (ref 40–120)
ALT FLD-CCNC: 10 U/L — SIGNIFICANT CHANGE UP (ref 10–45)
ANION GAP SERPL CALC-SCNC: 10 MMOL/L — SIGNIFICANT CHANGE UP (ref 5–17)
AST SERPL-CCNC: 23 U/L — SIGNIFICANT CHANGE UP (ref 10–40)
BILIRUB SERPL-MCNC: 0.2 MG/DL — SIGNIFICANT CHANGE UP (ref 0.2–1.2)
BUN SERPL-MCNC: 45 MG/DL — HIGH (ref 7–23)
CALCIUM SERPL-MCNC: 11.5 MG/DL — HIGH (ref 8.4–10.5)
CHLORIDE SERPL-SCNC: 109 MMOL/L — HIGH (ref 96–108)
CO2 SERPL-SCNC: 24 MMOL/L — SIGNIFICANT CHANGE UP (ref 22–31)
CREAT ?TM UR-MCNC: 78 MG/DL — SIGNIFICANT CHANGE UP
CREAT SERPL-MCNC: 1.09 MG/DL — SIGNIFICANT CHANGE UP (ref 0.5–1.3)
CRP SERPL-MCNC: 4 MG/L — SIGNIFICANT CHANGE UP (ref 0–4)
D DIMER BLD IA.RAPID-MCNC: 2760 NG/ML DDU — HIGH
D DIMER BLD IA.RAPID-MCNC: 2897 NG/ML DDU — HIGH
ESTIMATED AVERAGE GLUCOSE: 217 MG/DL — HIGH (ref 68–114)
FERRITIN SERPL-MCNC: 364 NG/ML — HIGH (ref 15–150)
GLUCOSE BLDC GLUCOMTR-MCNC: 115 MG/DL — HIGH (ref 70–99)
GLUCOSE BLDC GLUCOMTR-MCNC: 136 MG/DL — HIGH (ref 70–99)
GLUCOSE BLDC GLUCOMTR-MCNC: 148 MG/DL — HIGH (ref 70–99)
GLUCOSE SERPL-MCNC: 144 MG/DL — HIGH (ref 70–99)
HCT VFR BLD CALC: 42.6 % — SIGNIFICANT CHANGE UP (ref 34.5–45)
HCV AB S/CO SERPL IA: 0.09 S/CO — SIGNIFICANT CHANGE UP (ref 0–0.99)
HCV AB SERPL-IMP: SIGNIFICANT CHANGE UP
HGB BLD-MCNC: 13.4 G/DL — SIGNIFICANT CHANGE UP (ref 11.5–15.5)
MAGNESIUM SERPL-MCNC: 2.1 MG/DL — SIGNIFICANT CHANGE UP (ref 1.6–2.6)
MCHC RBC-ENTMCNC: 29.3 PG — SIGNIFICANT CHANGE UP (ref 27–34)
MCHC RBC-ENTMCNC: 31.5 GM/DL — LOW (ref 32–36)
MCV RBC AUTO: 93.2 FL — SIGNIFICANT CHANGE UP (ref 80–100)
NRBC # BLD: 0 /100 WBCS — SIGNIFICANT CHANGE UP (ref 0–0)
PHOSPHATE SERPL-MCNC: 2.9 MG/DL — SIGNIFICANT CHANGE UP (ref 2.5–4.5)
PLATELET # BLD AUTO: 215 K/UL — SIGNIFICANT CHANGE UP (ref 150–400)
POTASSIUM SERPL-MCNC: 3.8 MMOL/L — SIGNIFICANT CHANGE UP (ref 3.5–5.3)
POTASSIUM SERPL-SCNC: 3.8 MMOL/L — SIGNIFICANT CHANGE UP (ref 3.5–5.3)
POTASSIUM UR-SCNC: 28 MMOL/L — SIGNIFICANT CHANGE UP
PROT SERPL-MCNC: 7 G/DL — SIGNIFICANT CHANGE UP (ref 6–8.3)
RBC # BLD: 4.57 M/UL — SIGNIFICANT CHANGE UP (ref 3.8–5.2)
RBC # FLD: 14.7 % — HIGH (ref 10.3–14.5)
SODIUM SERPL-SCNC: 143 MMOL/L — SIGNIFICANT CHANGE UP (ref 135–145)
SODIUM UR-SCNC: 47 MMOL/L — SIGNIFICANT CHANGE UP
WBC # BLD: 6.87 K/UL — SIGNIFICANT CHANGE UP (ref 3.8–10.5)
WBC # FLD AUTO: 6.87 K/UL — SIGNIFICANT CHANGE UP (ref 3.8–10.5)

## 2022-01-21 PROCEDURE — 99233 SBSQ HOSP IP/OBS HIGH 50: CPT

## 2022-01-21 RX ORDER — LOSARTAN POTASSIUM 100 MG/1
25 TABLET, FILM COATED ORAL DAILY
Refills: 0 | Status: DISCONTINUED | OUTPATIENT
Start: 2022-01-21 | End: 2022-01-22

## 2022-01-21 RX ORDER — SODIUM CHLORIDE 9 MG/ML
1000 INJECTION, SOLUTION INTRAVENOUS
Refills: 0 | Status: DISCONTINUED | OUTPATIENT
Start: 2022-01-21 | End: 2022-01-22

## 2022-01-21 RX ORDER — CEFTRIAXONE 500 MG/1
1000 INJECTION, POWDER, FOR SOLUTION INTRAMUSCULAR; INTRAVENOUS EVERY 24 HOURS
Refills: 0 | Status: COMPLETED | OUTPATIENT
Start: 2022-01-22 | End: 2022-01-22

## 2022-01-21 RX ORDER — ASPIRIN/CALCIUM CARB/MAGNESIUM 324 MG
81 TABLET ORAL DAILY
Refills: 0 | Status: DISCONTINUED | OUTPATIENT
Start: 2022-01-21 | End: 2022-02-02

## 2022-01-21 RX ORDER — ATORVASTATIN CALCIUM 80 MG/1
40 TABLET, FILM COATED ORAL AT BEDTIME
Refills: 0 | Status: DISCONTINUED | OUTPATIENT
Start: 2022-01-21 | End: 2022-02-02

## 2022-01-21 RX ORDER — ENOXAPARIN SODIUM 100 MG/ML
60 INJECTION SUBCUTANEOUS
Refills: 0 | Status: DISCONTINUED | OUTPATIENT
Start: 2022-01-21 | End: 2022-01-25

## 2022-01-21 RX ADMIN — Medication 1 PATCH: at 07:00

## 2022-01-21 RX ADMIN — Medication 1 PATCH: at 19:54

## 2022-01-21 RX ADMIN — ENOXAPARIN SODIUM 60 MILLIGRAM(S): 100 INJECTION SUBCUTANEOUS at 18:35

## 2022-01-21 RX ADMIN — Medication 650 MILLIGRAM(S): at 23:12

## 2022-01-21 RX ADMIN — ATORVASTATIN CALCIUM 40 MILLIGRAM(S): 80 TABLET, FILM COATED ORAL at 22:35

## 2022-01-21 RX ADMIN — Medication 100 MILLIGRAM(S): at 22:35

## 2022-01-21 RX ADMIN — CEFTRIAXONE 100 MILLIGRAM(S): 500 INJECTION, POWDER, FOR SOLUTION INTRAMUSCULAR; INTRAVENOUS at 12:03

## 2022-01-21 RX ADMIN — SODIUM CHLORIDE 50 MILLILITER(S): 9 INJECTION, SOLUTION INTRAVENOUS at 12:39

## 2022-01-21 RX ADMIN — Medication 100 MILLIGRAM(S): at 13:25

## 2022-01-21 RX ADMIN — Medication 1 PATCH: at 05:49

## 2022-01-21 RX ADMIN — REMDESIVIR 500 MILLIGRAM(S): 5 INJECTION INTRAVENOUS at 11:24

## 2022-01-21 RX ADMIN — AMLODIPINE BESYLATE 10 MILLIGRAM(S): 2.5 TABLET ORAL at 09:39

## 2022-01-21 RX ADMIN — Medication 650 MILLIGRAM(S): at 23:09

## 2022-01-21 RX ADMIN — Medication 100 MILLIGRAM(S): at 05:49

## 2022-01-21 RX ADMIN — HEPARIN SODIUM 5000 UNIT(S): 5000 INJECTION INTRAVENOUS; SUBCUTANEOUS at 05:50

## 2022-01-21 RX ADMIN — LOSARTAN POTASSIUM 25 MILLIGRAM(S): 100 TABLET, FILM COATED ORAL at 12:02

## 2022-01-21 NOTE — PROGRESS NOTE ADULT - SUBJECTIVE AND OBJECTIVE BOX
Cassidy Romeo MD  Division of Hospital Medicine  Pager [phone], If no response/off hours [phone]    Patient is a 71y old  Female who presents with a chief complaint of AMS, r/o dissection (2022 09:00)        SUBJECTIVE / OVERNIGHT EVENTS:  overnight no events  RN at bedside providing Mongolian translation  states she wants to leave the hospital   denies complaints otherwise no n/v/f/chills, cp, cough, sob    CAPILLARY BLOOD GLUCOSE      POCT Blood Glucose.: 115 mg/dL (2022 11:50)  POCT Blood Glucose.: 135 mg/dL (2022 07:44)  POCT Blood Glucose.: 182 mg/dL (2022 18:20)    I&O's Summary    2022 07:01  -  2022 07:00  --------------------------------------------------------  IN: 1810 mL / OUT: 1050 mL / NET: 760 mL    2022 07:01  -  2022 15:03  --------------------------------------------------------  IN: 560 mL / OUT: 0 mL / NET: 560 mL      Vital Signs Last 24 Hrs  T(C): 36.3 (2022 10:39), Max: 37 (2022 09:37)  T(F): 97.4 (2022 10:39), Max: 98.6 (2022 09:37)  HR: 68 (2022 13:24) (53 - 74)  BP: 162/83 (2022 13:24) (154/69 - 182/84)  BP(mean): --  RR: 18 (2022 13:24) (18 - 18)  SpO2: 93% (2022 13:24) (93% - 98%)    PHYSICAL EXAM:  GENERAL:  Disheveled f, lying in bed, in NAD, breathing comfortably RA  HEAD:  NCAT  EYES: PERRLA, conjunctiva clear  NECK: Supple,  HEART: Reg rate. Normal S1, S2.  ABDOMEN: SNTND.  EXTREMITIES:  2+ Peripheral Pulses, No clubbing, cyanosis, edema.  PSYCH: appropriate affect, AAOX2  SKIN: No rashes or lesions    LABS:                        13.4   6.87  )-----------( 215      ( 2022 06:06 )             42.6         143  |  109<H>  |  45<H>  ----------------------------<  144<H>  3.8   |  24  |  1.09    Ca    11.5<H>      2022 06:04  Phos  2.9       Mg     2.1         TPro  7.0  /  Alb  3.6  /  TBili  0.2  /  DBili  x   /  AST  23  /  ALT  10  /  AlkPhos  70      PT/INR - ( 2022 01:18 )   PT: 11.7 sec;   INR: 0.97 ratio         PTT - ( 2022 01:18 )  PTT:25.5 sec  CARDIAC MARKERS ( 2022 06:42 )  x     / x     / 531 U/L / x     / 6.4 ng/mL      Urinalysis Basic - ( 2022 01:16 )    Color: Light Orange / Appearance: Turbid / S.039 / pH: x  Gluc: x / Ketone: Negative  / Bili: Negative / Urobili: Negative   Blood: x / Protein: 100 / Nitrite: Positive   Leuk Esterase: Large / RBC: 8 /hpf / WBC >50 /HPF   Sq Epi: x / Non Sq Epi: 2 / Bacteria: Many        Culture - Blood (collected 2022 04:38)  Source: .Blood Blood-Venous  Preliminary Report (2022 05:01):    No growth to date.    Culture - Blood (collected 2022 04:38)  Source: .Blood Blood-Peripheral  Preliminary Report (2022 05:01):    No growth to date.    RADIOLOGY & ADDITIONAL TESTS:  Consultant(s) Notes Reviewed:  CTS  Care Discussed with Consultants/Other Providers: Floor NP/PA    MEDICATIONS  (STANDING):  amLODIPine   Tablet 10 milliGRAM(s) Oral daily  cefTRIAXone   IVPB 1000 milliGRAM(s) IV Intermittent every 24 hours  dextrose 40% Gel 15 Gram(s) Oral once  dextrose 5%. 1000 milliLiter(s) (100 mL/Hr) IV Continuous <Continuous>  dextrose 50% Injectable 25 Gram(s) IV Push once  enoxaparin Injectable 60 milliGRAM(s) SubCutaneous two times a day  glucagon  Injectable 1 milliGRAM(s) IntraMuscular once  insulin lispro (ADMELOG) corrective regimen sliding scale   SubCutaneous three times a day before meals  labetalol 100 milliGRAM(s) Oral three times a day  lactated ringers. 1000 milliLiter(s) (50 mL/Hr) IV Continuous <Continuous>  losartan 25 milliGRAM(s) Oral daily  nicotine - 21 mG/24Hr(s) Patch 1 patch Transdermal daily  remdesivir  IVPB   IV Intermittent   remdesivir  IVPB 100 milliGRAM(s) IV Intermittent every 24 hours    MEDICATIONS  (PRN):  acetaminophen     Tablet .. 650 milliGRAM(s) Oral every 6 hours PRN Temp greater or equal to 38C (100.4F), Mild Pain (1 - 3)  acetaminophen  Suppository .. 650 milliGRAM(s) Rectal every 4 hours PRN Temp greater or equal to 38.5C (101.3F)  ALBUTerol    90 MICROgram(s) HFA Inhaler 2 Puff(s) Inhalation every 4 hours PRN Shortness of Breath and/or Wheezing  aluminum hydroxide/magnesium hydroxide/simethicone Suspension 30 milliLiter(s) Oral every 4 hours PRN Dyspepsia  benzonatate 100 milliGRAM(s) Oral three times a day PRN Cough  guaifenesin/dextromethorphan Oral Liquid 10 milliLiter(s) Oral every 4 hours PRN Cough  melatonin 3 milliGRAM(s) Oral at bedtime PRN Insomnia  ondansetron Injectable 4 milliGRAM(s) IV Push every 8 hours PRN Nausea and/or Vomiting

## 2022-01-21 NOTE — PROGRESS NOTE ADULT - PROBLEM SELECTOR PLAN 2
initially hypoxic to 92% improved to 95% on RA  otherwise asymptomatic  -given chronic medical conditions, high d-dimer, and risk for progression, c/w remdesivir iv  -d-dimer elevation in 2000s qualifies pt for full dose lovenox bid  -trend inflamm markers on 1/23 (ordered)  -check CTA and va duplex

## 2022-01-21 NOTE — PROGRESS NOTE ADULT - PROBLEM SELECTOR PLAN 7
DVT ppx: LOVENOX BID  PT eval  Dispo: pending improvement in BP, f/u CTA and dopplers, PT eval, TTE and final CTS recs

## 2022-01-21 NOTE — PROGRESS NOTE ADULT - ASSESSMENT
70 yo F PMH DM2, ?cognitive impairment, CVA w/ residual L arm weakness tx from [facility] with for CTS eval of thoracic aortic aneurysm evaluation now medically managing, found to have UTI, COVID+, elevated d-dimer, and hypercalcemia.

## 2022-01-21 NOTE — PROGRESS NOTE ADULT - PROBLEM SELECTOR PLAN 1
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

## 2022-01-22 DIAGNOSIS — E11.9 TYPE 2 DIABETES MELLITUS WITHOUT COMPLICATIONS: ICD-10-CM

## 2022-01-22 LAB
24R-OH-CALCIDIOL SERPL-MCNC: 8.8 NG/ML — LOW (ref 30–80)
ALBUMIN SERPL ELPH-MCNC: 3.5 G/DL — SIGNIFICANT CHANGE UP (ref 3.3–5)
ALP SERPL-CCNC: 65 U/L — SIGNIFICANT CHANGE UP (ref 40–120)
ALT FLD-CCNC: 11 U/L — SIGNIFICANT CHANGE UP (ref 10–45)
ANION GAP SERPL CALC-SCNC: 12 MMOL/L — SIGNIFICANT CHANGE UP (ref 5–17)
AST SERPL-CCNC: 23 U/L — SIGNIFICANT CHANGE UP (ref 10–40)
BASOPHILS # BLD AUTO: 0.02 K/UL — SIGNIFICANT CHANGE UP (ref 0–0.2)
BASOPHILS NFR BLD AUTO: 0.3 % — SIGNIFICANT CHANGE UP (ref 0–2)
BILIRUB SERPL-MCNC: 0.2 MG/DL — SIGNIFICANT CHANGE UP (ref 0.2–1.2)
BUN SERPL-MCNC: 30 MG/DL — HIGH (ref 7–23)
CALCIUM SERPL-MCNC: 10.8 MG/DL — HIGH (ref 8.4–10.5)
CALCIUM SERPL-MCNC: 11 MG/DL — HIGH (ref 8.4–10.5)
CHLORIDE SERPL-SCNC: 108 MMOL/L — SIGNIFICANT CHANGE UP (ref 96–108)
CO2 SERPL-SCNC: 24 MMOL/L — SIGNIFICANT CHANGE UP (ref 22–31)
CREAT SERPL-MCNC: 0.9 MG/DL — SIGNIFICANT CHANGE UP (ref 0.5–1.3)
D DIMER BLD IA.RAPID-MCNC: 2251 NG/ML DDU — HIGH
EOSINOPHIL # BLD AUTO: 0.01 K/UL — SIGNIFICANT CHANGE UP (ref 0–0.5)
EOSINOPHIL NFR BLD AUTO: 0.1 % — SIGNIFICANT CHANGE UP (ref 0–6)
GLUCOSE BLDC GLUCOMTR-MCNC: 128 MG/DL — HIGH (ref 70–99)
GLUCOSE BLDC GLUCOMTR-MCNC: 140 MG/DL — HIGH (ref 70–99)
GLUCOSE BLDC GLUCOMTR-MCNC: 171 MG/DL — HIGH (ref 70–99)
GLUCOSE SERPL-MCNC: 88 MG/DL — SIGNIFICANT CHANGE UP (ref 70–99)
HCT VFR BLD CALC: 44.8 % — SIGNIFICANT CHANGE UP (ref 34.5–45)
HGB BLD-MCNC: 13.9 G/DL — SIGNIFICANT CHANGE UP (ref 11.5–15.5)
IMM GRANULOCYTES NFR BLD AUTO: 0.4 % — SIGNIFICANT CHANGE UP (ref 0–1.5)
LYMPHOCYTES # BLD AUTO: 2.15 K/UL — SIGNIFICANT CHANGE UP (ref 1–3.3)
LYMPHOCYTES # BLD AUTO: 28.4 % — SIGNIFICANT CHANGE UP (ref 13–44)
MCHC RBC-ENTMCNC: 29.3 PG — SIGNIFICANT CHANGE UP (ref 27–34)
MCHC RBC-ENTMCNC: 31 GM/DL — LOW (ref 32–36)
MCV RBC AUTO: 94.5 FL — SIGNIFICANT CHANGE UP (ref 80–100)
MONOCYTES # BLD AUTO: 0.72 K/UL — SIGNIFICANT CHANGE UP (ref 0–0.9)
MONOCYTES NFR BLD AUTO: 9.5 % — SIGNIFICANT CHANGE UP (ref 2–14)
NEUTROPHILS # BLD AUTO: 4.63 K/UL — SIGNIFICANT CHANGE UP (ref 1.8–7.4)
NEUTROPHILS NFR BLD AUTO: 61.3 % — SIGNIFICANT CHANGE UP (ref 43–77)
NRBC # BLD: 0 /100 WBCS — SIGNIFICANT CHANGE UP (ref 0–0)
PLATELET # BLD AUTO: 207 K/UL — SIGNIFICANT CHANGE UP (ref 150–400)
POTASSIUM SERPL-MCNC: 3.7 MMOL/L — SIGNIFICANT CHANGE UP (ref 3.5–5.3)
POTASSIUM SERPL-SCNC: 3.7 MMOL/L — SIGNIFICANT CHANGE UP (ref 3.5–5.3)
PROT SERPL-MCNC: 6.3 G/DL — SIGNIFICANT CHANGE UP (ref 6–8.3)
PROT SERPL-MCNC: 6.3 G/DL — SIGNIFICANT CHANGE UP (ref 6–8.3)
PROT SERPL-MCNC: 6.7 G/DL — SIGNIFICANT CHANGE UP (ref 6–8.3)
PTH-INTACT FLD-MCNC: 81 PG/ML — HIGH (ref 15–65)
RBC # BLD: 4.74 M/UL — SIGNIFICANT CHANGE UP (ref 3.8–5.2)
RBC # FLD: 14.9 % — HIGH (ref 10.3–14.5)
SODIUM SERPL-SCNC: 144 MMOL/L — SIGNIFICANT CHANGE UP (ref 135–145)
VIT D25+D1,25 OH+D1,25 PNL SERPL-MCNC: 33.9 PG/ML — SIGNIFICANT CHANGE UP (ref 19.9–79.3)
WBC # BLD: 7.56 K/UL — SIGNIFICANT CHANGE UP (ref 3.8–10.5)
WBC # FLD AUTO: 7.56 K/UL — SIGNIFICANT CHANGE UP (ref 3.8–10.5)

## 2022-01-22 PROCEDURE — 99232 SBSQ HOSP IP/OBS MODERATE 35: CPT

## 2022-01-22 RX ORDER — LOSARTAN POTASSIUM 100 MG/1
25 TABLET, FILM COATED ORAL ONCE
Refills: 0 | Status: COMPLETED | OUTPATIENT
Start: 2022-01-22 | End: 2022-01-22

## 2022-01-22 RX ORDER — LOSARTAN POTASSIUM 100 MG/1
50 TABLET, FILM COATED ORAL DAILY
Refills: 0 | Status: DISCONTINUED | OUTPATIENT
Start: 2022-01-23 | End: 2022-01-23

## 2022-01-22 RX ORDER — POTASSIUM CHLORIDE 20 MEQ
40 PACKET (EA) ORAL ONCE
Refills: 0 | Status: COMPLETED | OUTPATIENT
Start: 2022-01-22 | End: 2022-01-22

## 2022-01-22 RX ADMIN — Medication 81 MILLIGRAM(S): at 11:09

## 2022-01-22 RX ADMIN — Medication 1: at 18:05

## 2022-01-22 RX ADMIN — Medication 100 MILLIGRAM(S): at 05:01

## 2022-01-22 RX ADMIN — ENOXAPARIN SODIUM 60 MILLIGRAM(S): 100 INJECTION SUBCUTANEOUS at 05:02

## 2022-01-22 RX ADMIN — REMDESIVIR 500 MILLIGRAM(S): 5 INJECTION INTRAVENOUS at 11:02

## 2022-01-22 RX ADMIN — LOSARTAN POTASSIUM 25 MILLIGRAM(S): 100 TABLET, FILM COATED ORAL at 14:39

## 2022-01-22 RX ADMIN — Medication 100 MILLIGRAM(S): at 22:19

## 2022-01-22 RX ADMIN — LOSARTAN POTASSIUM 25 MILLIGRAM(S): 100 TABLET, FILM COATED ORAL at 05:01

## 2022-01-22 RX ADMIN — CEFTRIAXONE 100 MILLIGRAM(S): 500 INJECTION, POWDER, FOR SOLUTION INTRAMUSCULAR; INTRAVENOUS at 12:22

## 2022-01-22 RX ADMIN — Medication 1 PATCH: at 23:11

## 2022-01-22 RX ADMIN — Medication 100 MILLIGRAM(S): at 13:50

## 2022-01-22 RX ADMIN — ENOXAPARIN SODIUM 60 MILLIGRAM(S): 100 INJECTION SUBCUTANEOUS at 18:05

## 2022-01-22 RX ADMIN — Medication 1 PATCH: at 05:00

## 2022-01-22 RX ADMIN — Medication 40 MILLIEQUIVALENT(S): at 18:06

## 2022-01-22 RX ADMIN — Medication 1 PATCH: at 23:12

## 2022-01-22 RX ADMIN — AMLODIPINE BESYLATE 10 MILLIGRAM(S): 2.5 TABLET ORAL at 05:01

## 2022-01-22 RX ADMIN — Medication 1 PATCH: at 05:02

## 2022-01-22 NOTE — PROGRESS NOTE ADULT - ASSESSMENT
70 yo F PMH DM2, ?cognitive impairment, CVA w/ residual L arm weakness tx from [facility] with for CTS eval of thoracic aortic aneurysm evaluation now medically managing, found to have UTI, COVID+, elevated d-dimer, and hypercalcemia.

## 2022-01-22 NOTE — PROGRESS NOTE ADULT - PROBLEM SELECTOR PLAN 1
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

## 2022-01-22 NOTE — PROGRESS NOTE ADULT - PROBLEM SELECTOR PLAN 2
initially hypoxic to 92% improved to 95% on RA  otherwise asymptomatic  - given chronic medical conditions, high d-dimer, and risk for progression, will treat with remdesivir while inpatient  - d-dimer elevation in 2000s qualifies patient for therapeutic lovenox 60mg BID  - trend inflamm markers q48-72 hours  - check CTA and va duplex - we discussed at length bedside today with  (RN as witness) necessity of these studies. is amenable to go now.

## 2022-01-22 NOTE — PROGRESS NOTE ADULT - SUBJECTIVE AND OBJECTIVE BOX
[facility] Division of Hospital Medicine  Alice Daniels MD  Pager (M-F, 4B-1P): [phone]  Other Times:  [phone]      Patient is a 71y old  Female who presents with a chief complaint of AMS, r/o dissection (21 Jan 2022 15:03)      SUBJECTIVE / OVERNIGHT EVENTS:  used for translation. Discussed at length regarding her reason for transfer, findings, etc. Explained to her AT LENGTH necessity of CTA and duplex and verbalized understanding and will go for imaging, but refusing to go today because "she doesn't feel like it". Will go tomorrow for CT.  ADDITIONAL REVIEW OF SYSTEMS:    MEDICATIONS  (STANDING):  amLODIPine   Tablet 10 milliGRAM(s) Oral daily  aspirin  chewable 81 milliGRAM(s) Oral daily  atorvastatin 40 milliGRAM(s) Oral at bedtime  dextrose 40% Gel 15 Gram(s) Oral once  dextrose 5%. 1000 milliLiter(s) (100 mL/Hr) IV Continuous <Continuous>  dextrose 50% Injectable 25 Gram(s) IV Push once  enoxaparin Injectable 60 milliGRAM(s) SubCutaneous two times a day  glucagon  Injectable 1 milliGRAM(s) IntraMuscular once  insulin lispro (ADMELOG) corrective regimen sliding scale   SubCutaneous three times a day before meals  labetalol 100 milliGRAM(s) Oral three times a day  lactated ringers. 1000 milliLiter(s) (50 mL/Hr) IV Continuous <Continuous>  losartan 25 milliGRAM(s) Oral daily  nicotine - 21 mG/24Hr(s) Patch 1 patch Transdermal daily  remdesivir  IVPB   IV Intermittent   remdesivir  IVPB 100 milliGRAM(s) IV Intermittent every 24 hours    MEDICATIONS  (PRN):  acetaminophen     Tablet .. 650 milliGRAM(s) Oral every 6 hours PRN Temp greater or equal to 38C (100.4F), Mild Pain (1 - 3)  acetaminophen  Suppository .. 650 milliGRAM(s) Rectal every 4 hours PRN Temp greater or equal to 38.5C (101.3F)  ALBUTerol    90 MICROgram(s) HFA Inhaler 2 Puff(s) Inhalation every 4 hours PRN Shortness of Breath and/or Wheezing  aluminum hydroxide/magnesium hydroxide/simethicone Suspension 30 milliLiter(s) Oral every 4 hours PRN Dyspepsia  benzonatate 100 milliGRAM(s) Oral three times a day PRN Cough  guaifenesin/dextromethorphan Oral Liquid 10 milliLiter(s) Oral every 4 hours PRN Cough  melatonin 3 milliGRAM(s) Oral at bedtime PRN Insomnia  ondansetron Injectable 4 milliGRAM(s) IV Push every 8 hours PRN Nausea and/or Vomiting      CAPILLARY BLOOD GLUCOSE      POCT Blood Glucose.: 128 mg/dL (22 Jan 2022 08:54)  POCT Blood Glucose.: 136 mg/dL (21 Jan 2022 21:05)  POCT Blood Glucose.: 148 mg/dL (21 Jan 2022 17:41)    I&O's Summary    21 Jan 2022 07:01  -  22 Jan 2022 07:00  --------------------------------------------------------  IN: 1220 mL / OUT: 1150 mL / NET: 70 mL    22 Jan 2022 07:01  -  22 Jan 2022 14:22  --------------------------------------------------------  IN: 240 mL / OUT: 0 mL / NET: 240 mL        PHYSICAL EXAM:  Vital Signs Last 24 Hrs  T(C): 36.9 (22 Jan 2022 11:02), Max: 37.4 (21 Jan 2022 20:09)  T(F): 98.4 (22 Jan 2022 11:02), Max: 99.3 (21 Jan 2022 20:09)  HR: 58 (22 Jan 2022 13:45) (58 - 66)  BP: 169/85 (22 Jan 2022 13:45) (133/68 - 169/85)  BP(mean): --  RR: 18 (22 Jan 2022 13:45) (18 - 18)  SpO2: 97% (22 Jan 2022 13:45) (93% - 97%)    CONSTITUTIONAL: NAD, well-developed  EYES: PERRLA; conjunctiva and sclera clear  ENMT: Moist oral mucosa, no pharyngeal injection or exudates; normal dentition  NECK: Supple, no palpable masses; no thyromegaly  RESPIRATORY: Normal respiratory effort; lungs are clear to auscultation bilaterally  CARDIOVASCULAR: Regular rate and rhythm, normal S1 and S2, no murmur/rub/gallop; No lower extremity edema; Peripheral pulses are 2+ bilaterally  ABDOMEN: Soft, Nondistended, Nontender to palpation, normoactive bowel sounds  MUSCULOSKELETAL:  No clubbing or cyanosis of digits; no joint swelling or tenderness to palpation  PSYCH: A+O to person, place, and time; affect appropriate  NEUROLOGY: no gross sensory deficits, +left sided weakness (old CVA)  SKIN: No rashes; no palpable lesions    LABS:                        13.9   7.56  )-----------( 207      ( 22 Jan 2022 08:39 )             44.8     01-22    144  |  108  |  30<H>  ----------------------------<  88  3.7   |  24  |  0.90    Ca    10.8<H>      22 Jan 2022 08:40  Phos  2.9     01-21  Mg     2.1     01-21    TPro  6.3  /  Alb  x   /  TBili  x   /  DBili  x   /  AST  x   /  ALT  x   /  AlkPhos  x   01-22        Culture - Blood (collected 20 Jan 2022 04:38)  Source: .Blood Blood-Venous  Preliminary Report (21 Jan 2022 05:01):    No growth to date.    Culture - Blood (collected 20 Jan 2022 04:38)  Source: .Blood Blood-Peripheral  Preliminary Report (21 Jan 2022 05:01):    No growth to date.    Culture - Urine (collected 20 Jan 2022 04:13)  Source: Clean Catch Clean Catch (Midstream)  Preliminary Report (22 Jan 2022 10:23):    >100,000 CFU/ml Escherichia coli      RADIOLOGY & ADDITIONAL TESTS:  Results Reviewed: urine cx growing >100K E. coli  Imaging Personally Reviewed:  Electrocardiogram Personally Reviewed:    COORDINATION OF CARE:  Care Discussed with Consultants/Other Providers [Y]: medicine NP Jeny  Prior or Outpatient Records Reviewed [Y/N]:

## 2022-01-22 NOTE — PROGRESS NOTE ADULT - NSPROGADDITIONALINFOA_GEN_ALL_CORE
.  Alice Daniels MD  Division of Hospital Medicine  [facility]   Pager: [phone]    Plan discussed with patient via , RN bedside, and medicine NP Jeny.

## 2022-01-22 NOTE — PROGRESS NOTE ADULT - PROBLEM SELECTOR PLAN 4
- c/w ceftriaxone 1g qd finish 1/22 3 day course  - urine cx growing >100K E. coli, f/u sensitivities  - f/u blood cx - NGTD

## 2022-01-22 NOTE — PROGRESS NOTE ADULT - PROBLEM SELECTOR PLAN 7
DVT ppx: therapeutic lovenox given elevated d-dimer  PT eval  Dispo: pending improvement in BP, f/u CTA and dopplers, PT eval, TTE and final CTS recs HbA1c 9.2%. poorly uncontrolled.  - c/w sliding scale  - will add basal/bolus if needed  - will need to discharge home on oral regimen likely metformin

## 2022-01-23 DIAGNOSIS — I74.9 EMBOLISM AND THROMBOSIS OF UNSPECIFIED ARTERY: ICD-10-CM

## 2022-01-23 LAB
-  AMIKACIN: SIGNIFICANT CHANGE UP
-  AMOXICILLIN/CLAVULANIC ACID: SIGNIFICANT CHANGE UP
-  AMPICILLIN/SULBACTAM: SIGNIFICANT CHANGE UP
-  AMPICILLIN: SIGNIFICANT CHANGE UP
-  AZTREONAM: SIGNIFICANT CHANGE UP
-  CEFAZOLIN: SIGNIFICANT CHANGE UP
-  CEFEPIME: SIGNIFICANT CHANGE UP
-  CEFOXITIN: SIGNIFICANT CHANGE UP
-  CEFTRIAXONE: SIGNIFICANT CHANGE UP
-  CIPROFLOXACIN: SIGNIFICANT CHANGE UP
-  ERTAPENEM: SIGNIFICANT CHANGE UP
-  GENTAMICIN: SIGNIFICANT CHANGE UP
-  IMIPENEM: SIGNIFICANT CHANGE UP
-  LEVOFLOXACIN: SIGNIFICANT CHANGE UP
-  MEROPENEM: SIGNIFICANT CHANGE UP
-  NITROFURANTOIN: SIGNIFICANT CHANGE UP
-  PIPERACILLIN/TAZOBACTAM: SIGNIFICANT CHANGE UP
-  TIGECYCLINE: SIGNIFICANT CHANGE UP
-  TOBRAMYCIN: SIGNIFICANT CHANGE UP
-  TRIMETHOPRIM/SULFAMETHOXAZOLE: SIGNIFICANT CHANGE UP
ALBUMIN SERPL ELPH-MCNC: 3.8 G/DL — SIGNIFICANT CHANGE UP (ref 3.3–5)
ALP SERPL-CCNC: 80 U/L — SIGNIFICANT CHANGE UP (ref 40–120)
ALT FLD-CCNC: 10 U/L — SIGNIFICANT CHANGE UP (ref 10–45)
ANION GAP SERPL CALC-SCNC: 13 MMOL/L — SIGNIFICANT CHANGE UP (ref 5–17)
AST SERPL-CCNC: 23 U/L — SIGNIFICANT CHANGE UP (ref 10–40)
BILIRUB SERPL-MCNC: 0.3 MG/DL — SIGNIFICANT CHANGE UP (ref 0.2–1.2)
BUN SERPL-MCNC: 19 MG/DL — SIGNIFICANT CHANGE UP (ref 7–23)
CALCIUM SERPL-MCNC: 11.2 MG/DL — HIGH (ref 8.4–10.5)
CHLORIDE SERPL-SCNC: 103 MMOL/L — SIGNIFICANT CHANGE UP (ref 96–108)
CO2 SERPL-SCNC: 25 MMOL/L — SIGNIFICANT CHANGE UP (ref 22–31)
CREAT SERPL-MCNC: 0.76 MG/DL — SIGNIFICANT CHANGE UP (ref 0.5–1.3)
CRP SERPL-MCNC: 5 MG/L — HIGH (ref 0–4)
CULTURE RESULTS: SIGNIFICANT CHANGE UP
D DIMER BLD IA.RAPID-MCNC: 1915 NG/ML DDU — HIGH
FERRITIN SERPL-MCNC: 446 NG/ML — HIGH (ref 15–150)
GLUCOSE BLDC GLUCOMTR-MCNC: 131 MG/DL — HIGH (ref 70–99)
GLUCOSE BLDC GLUCOMTR-MCNC: 147 MG/DL — HIGH (ref 70–99)
GLUCOSE BLDC GLUCOMTR-MCNC: 225 MG/DL — HIGH (ref 70–99)
GLUCOSE SERPL-MCNC: 149 MG/DL — HIGH (ref 70–99)
MAGNESIUM SERPL-MCNC: 1.7 MG/DL — SIGNIFICANT CHANGE UP (ref 1.6–2.6)
METHOD TYPE: SIGNIFICANT CHANGE UP
ORGANISM # SPEC MICROSCOPIC CNT: SIGNIFICANT CHANGE UP
ORGANISM # SPEC MICROSCOPIC CNT: SIGNIFICANT CHANGE UP
PHOSPHATE SERPL-MCNC: 1.9 MG/DL — LOW (ref 2.5–4.5)
POTASSIUM SERPL-MCNC: 3.7 MMOL/L — SIGNIFICANT CHANGE UP (ref 3.5–5.3)
POTASSIUM SERPL-SCNC: 3.7 MMOL/L — SIGNIFICANT CHANGE UP (ref 3.5–5.3)
PROT SERPL-MCNC: 7.7 G/DL — SIGNIFICANT CHANGE UP (ref 6–8.3)
SODIUM SERPL-SCNC: 141 MMOL/L — SIGNIFICANT CHANGE UP (ref 135–145)
SPECIMEN SOURCE: SIGNIFICANT CHANGE UP
VIT B12 SERPL-MCNC: 748 PG/ML — SIGNIFICANT CHANGE UP (ref 232–1245)

## 2022-01-23 PROCEDURE — 71275 CT ANGIOGRAPHY CHEST: CPT | Mod: 26

## 2022-01-23 PROCEDURE — 93970 EXTREMITY STUDY: CPT | Mod: 26

## 2022-01-23 PROCEDURE — 99233 SBSQ HOSP IP/OBS HIGH 50: CPT

## 2022-01-23 RX ORDER — MAGNESIUM SULFATE 500 MG/ML
2 VIAL (ML) INJECTION ONCE
Refills: 0 | Status: COMPLETED | OUTPATIENT
Start: 2022-01-23 | End: 2022-01-23

## 2022-01-23 RX ORDER — MAGNESIUM SULFATE 500 MG/ML
2 VIAL (ML) INJECTION ONCE
Refills: 0 | Status: DISCONTINUED | OUTPATIENT
Start: 2022-01-23 | End: 2022-01-24

## 2022-01-23 RX ORDER — POTASSIUM CHLORIDE 20 MEQ
40 PACKET (EA) ORAL ONCE
Refills: 0 | Status: COMPLETED | OUTPATIENT
Start: 2022-01-23 | End: 2022-01-23

## 2022-01-23 RX ORDER — LOSARTAN POTASSIUM 100 MG/1
50 TABLET, FILM COATED ORAL ONCE
Refills: 0 | Status: COMPLETED | OUTPATIENT
Start: 2022-01-23 | End: 2022-01-23

## 2022-01-23 RX ORDER — POTASSIUM PHOSPHATE, MONOBASIC POTASSIUM PHOSPHATE, DIBASIC 236; 224 MG/ML; MG/ML
30 INJECTION, SOLUTION INTRAVENOUS ONCE
Refills: 0 | Status: DISCONTINUED | OUTPATIENT
Start: 2022-01-23 | End: 2022-01-24

## 2022-01-23 RX ORDER — LOSARTAN POTASSIUM 100 MG/1
100 TABLET, FILM COATED ORAL DAILY
Refills: 0 | Status: DISCONTINUED | OUTPATIENT
Start: 2022-01-24 | End: 2022-01-28

## 2022-01-23 RX ADMIN — Medication 40 MILLIEQUIVALENT(S): at 19:00

## 2022-01-23 RX ADMIN — ENOXAPARIN SODIUM 60 MILLIGRAM(S): 100 INJECTION SUBCUTANEOUS at 05:20

## 2022-01-23 RX ADMIN — REMDESIVIR 500 MILLIGRAM(S): 5 INJECTION INTRAVENOUS at 11:23

## 2022-01-23 RX ADMIN — Medication 650 MILLIGRAM(S): at 19:13

## 2022-01-23 RX ADMIN — AMLODIPINE BESYLATE 10 MILLIGRAM(S): 2.5 TABLET ORAL at 05:20

## 2022-01-23 RX ADMIN — Medication 1 PATCH: at 07:00

## 2022-01-23 RX ADMIN — Medication 650 MILLIGRAM(S): at 19:43

## 2022-01-23 RX ADMIN — LOSARTAN POTASSIUM 50 MILLIGRAM(S): 100 TABLET, FILM COATED ORAL at 05:20

## 2022-01-23 RX ADMIN — Medication 81 MILLIGRAM(S): at 11:30

## 2022-01-23 RX ADMIN — Medication 100 MILLIGRAM(S): at 05:20

## 2022-01-23 RX ADMIN — Medication 25 GRAM(S): at 19:14

## 2022-01-23 RX ADMIN — Medication 85 MILLIMOLE(S): at 19:14

## 2022-01-23 RX ADMIN — Medication 1 PATCH: at 06:49

## 2022-01-23 RX ADMIN — LOSARTAN POTASSIUM 50 MILLIGRAM(S): 100 TABLET, FILM COATED ORAL at 19:00

## 2022-01-23 RX ADMIN — Medication 1 PATCH: at 19:00

## 2022-01-23 RX ADMIN — Medication 100 MILLIGRAM(S): at 20:01

## 2022-01-23 NOTE — DIETITIAN INITIAL EVALUATION ADULT. - ADD RECOMMEND
1) Will continue to monitor PO intake, weight, labs, skin, GI status and diet 2) Obtain food preferences as able 3) Consider addition of Multivitamin if no contraindications 4) Replenish Vitamin D if no contraindications - 13535 units daily x 5 days. Obtain new level s/p replenishment 5) Gentle encouragement at meals 1) Will continue to monitor PO intake, weight, labs, skin, GI status and diet 2) Obtain food preferences as able 3) Consider addition of Multivitamin if no contraindications 4) Replenish Vitamin D if no contraindications - 68311 units daily x 5 days. Obtain new level s/p replenishment 5) Gentle encouragement at meals 6) Liberalize diet in setting of Poor PO intake, d/c DASH restriction

## 2022-01-23 NOTE — PROGRESS NOTE ADULT - PROBLEM SELECTOR PLAN 2
on verbal report from sonographer bedside, informed has both LLE proximal arterial and venous thrombosis  - c/w therapeutic lovenox  - awaiting official report after which will call vascular surgery eval today

## 2022-01-23 NOTE — DIETITIAN INITIAL EVALUATION ADULT. - PROBLEM SELECTOR PLAN 1
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

## 2022-01-23 NOTE — PROGRESS NOTE ADULT - PROBLEM SELECTOR PLAN 3
asymptomatic and Ca<12  - improved s/p IV hydration  - iPTH elevated, with low vit D 25-OH. suspect primary hyperparathyroidism with concomitant low vit D  - check 24 hr urinary calcium  - check parathyroid US  - f/u SPEP, UPEP - still pending results

## 2022-01-23 NOTE — PROGRESS NOTE ADULT - NSPROGADDITIONALINFOA_GEN_ALL_CORE
.  Alice Daniels MD  Division of Hospital Medicine  [facility]   Pager: [phone]    Plan discussed with patient via , RN bedside, and medicine SAURABH Plata.

## 2022-01-23 NOTE — PROGRESS NOTE ADULT - PROBLEM SELECTOR PLAN 6
- c/w ceftriaxone 1g qd finish 1/22 3 day course  - urine cx growing >100K E. coli, f/u sensitivities  - f/u blood cx - NGTD - completed ceftriaxone 1g q24h x 3 days on 1/22  - urine cx growing >100K E. coli, pan-sensitive  - f/u blood cx - NGTD

## 2022-01-23 NOTE — DIETITIAN INITIAL EVALUATION ADULT. - PERTINENT MEDS FT
MEDICATIONS  (STANDING):  amLODIPine   Tablet 10 milliGRAM(s) Oral daily  aspirin  chewable 81 milliGRAM(s) Oral daily  atorvastatin 40 milliGRAM(s) Oral at bedtime  dextrose 40% Gel 15 Gram(s) Oral once  dextrose 5%. 1000 milliLiter(s) (100 mL/Hr) IV Continuous <Continuous>  dextrose 50% Injectable 25 Gram(s) IV Push once  enoxaparin Injectable 60 milliGRAM(s) SubCutaneous two times a day  glucagon  Injectable 1 milliGRAM(s) IntraMuscular once  insulin lispro (ADMELOG) corrective regimen sliding scale   SubCutaneous three times a day before meals  labetalol 100 milliGRAM(s) Oral three times a day  losartan 50 milliGRAM(s) Oral daily  nicotine - 21 mG/24Hr(s) Patch 1 patch Transdermal daily  remdesivir  IVPB   IV Intermittent   remdesivir  IVPB 100 milliGRAM(s) IV Intermittent every 24 hours    MEDICATIONS  (PRN):  acetaminophen     Tablet .. 650 milliGRAM(s) Oral every 6 hours PRN Temp greater or equal to 38C (100.4F), Mild Pain (1 - 3)  acetaminophen  Suppository .. 650 milliGRAM(s) Rectal every 4 hours PRN Temp greater or equal to 38.5C (101.3F)  ALBUTerol    90 MICROgram(s) HFA Inhaler 2 Puff(s) Inhalation every 4 hours PRN Shortness of Breath and/or Wheezing  aluminum hydroxide/magnesium hydroxide/simethicone Suspension 30 milliLiter(s) Oral every 4 hours PRN Dyspepsia  benzonatate 100 milliGRAM(s) Oral three times a day PRN Cough  guaifenesin/dextromethorphan Oral Liquid 10 milliLiter(s) Oral every 4 hours PRN Cough  melatonin 3 milliGRAM(s) Oral at bedtime PRN Insomnia  ondansetron Injectable 4 milliGRAM(s) IV Push every 8 hours PRN Nausea and/or Vomiting

## 2022-01-23 NOTE — PROGRESS NOTE ADULT - SUBJECTIVE AND OBJECTIVE BOX
[facility] Division of Hospital Medicine  Alice Daniels MD  Pager (M-F, 1U-3I): [phone]  Other Times:  [phone]      Patient is a 71y old  Female who presents with a chief complaint of AMS, r/o dissection (23 Jan 2022 10:18)      SUBJECTIVE / OVERNIGHT EVENTS:  used for translation. complaining of LLE pain today otherwise no issues other than wanting to walk with PT. needs constant redirection. sonographer was bedside and informed verbally has both venous and arterial proximal LLE VTE - official report pending.  ADDITIONAL REVIEW OF SYSTEMS:    MEDICATIONS  (STANDING):  amLODIPine   Tablet 10 milliGRAM(s) Oral daily  aspirin  chewable 81 milliGRAM(s) Oral daily  atorvastatin 40 milliGRAM(s) Oral at bedtime  dextrose 40% Gel 15 Gram(s) Oral once  dextrose 5%. 1000 milliLiter(s) (100 mL/Hr) IV Continuous <Continuous>  dextrose 50% Injectable 25 Gram(s) IV Push once  enoxaparin Injectable 60 milliGRAM(s) SubCutaneous two times a day  glucagon  Injectable 1 milliGRAM(s) IntraMuscular once  insulin lispro (ADMELOG) corrective regimen sliding scale   SubCutaneous three times a day before meals  labetalol 100 milliGRAM(s) Oral three times a day  losartan 50 milliGRAM(s) Oral daily  magnesium sulfate  IVPB 2 Gram(s) IV Intermittent once  nicotine - 21 mG/24Hr(s) Patch 1 patch Transdermal daily  potassium phosphate IVPB 30 milliMole(s) IV Intermittent once  remdesivir  IVPB   IV Intermittent   remdesivir  IVPB 100 milliGRAM(s) IV Intermittent every 24 hours    MEDICATIONS  (PRN):  acetaminophen     Tablet .. 650 milliGRAM(s) Oral every 6 hours PRN Temp greater or equal to 38C (100.4F), Mild Pain (1 - 3)  acetaminophen  Suppository .. 650 milliGRAM(s) Rectal every 4 hours PRN Temp greater or equal to 38.5C (101.3F)  ALBUTerol    90 MICROgram(s) HFA Inhaler 2 Puff(s) Inhalation every 4 hours PRN Shortness of Breath and/or Wheezing  aluminum hydroxide/magnesium hydroxide/simethicone Suspension 30 milliLiter(s) Oral every 4 hours PRN Dyspepsia  benzonatate 100 milliGRAM(s) Oral three times a day PRN Cough  guaifenesin/dextromethorphan Oral Liquid 10 milliLiter(s) Oral every 4 hours PRN Cough  melatonin 3 milliGRAM(s) Oral at bedtime PRN Insomnia  ondansetron Injectable 4 milliGRAM(s) IV Push every 8 hours PRN Nausea and/or Vomiting      CAPILLARY BLOOD GLUCOSE      POCT Blood Glucose.: 147 mg/dL (23 Jan 2022 13:00)  POCT Blood Glucose.: 131 mg/dL (23 Jan 2022 08:13)  POCT Blood Glucose.: 140 mg/dL (22 Jan 2022 21:57)  POCT Blood Glucose.: 171 mg/dL (22 Jan 2022 17:11)    I&O's Summary    22 Jan 2022 07:01  -  23 Jan 2022 07:00  --------------------------------------------------------  IN: 240 mL / OUT: 1500 mL / NET: -1260 mL        PHYSICAL EXAM:  Vital Signs Last 24 Hrs  T(C): 36.4 (23 Jan 2022 09:15), Max: 36.9 (22 Jan 2022 21:12)  T(F): 97.6 (23 Jan 2022 09:15), Max: 98.5 (23 Jan 2022 05:00)  HR: 55 (23 Jan 2022 09:15) (55 - 60)  BP: 153/77 (23 Jan 2022 09:15) (153/77 - 169/90)  BP(mean): --  RR: 18 (23 Jan 2022 09:15) (18 - 18)  SpO2: 96% (23 Jan 2022 09:15) (95% - 97%)    CONSTITUTIONAL: NAD, well-developed  EYES: PERRLA; conjunctiva and sclera clear  ENMT: Moist oral mucosa, no pharyngeal injection or exudates; normal dentition  NECK: Supple, no palpable masses; no thyromegaly  RESPIRATORY: Normal respiratory effort; lungs are clear to auscultation bilaterally  CARDIOVASCULAR: Regular rate and rhythm, normal S1 and S2, no murmur/rub/gallop; No lower extremity edema; Peripheral pulses are 2+ bilaterally  ABDOMEN: Soft, Nondistended, Nontender to palpation, normoactive bowel sounds  MUSCULOSKELETAL:  No clubbing or cyanosis of digits; no joint swelling or tenderness to palpation  PSYCH: A+O to person, place, and time; affect appropriate  NEUROLOGY: no gross sensory deficits, +left sided weakness (old CVA)  SKIN: No rashes; no palpable lesions    LABS:                        13.9   7.56  )-----------( 207      ( 22 Jan 2022 08:39 )             44.8     01-23    141  |  103  |  19  ----------------------------<  149<H>  3.7   |  25  |  0.76    Ca    11.2<H>      23 Jan 2022 11:25  Phos  1.9     01-23  Mg     1.7     01-23    TPro  7.7  /  Alb  3.8  /  TBili  0.3  /  DBili  x   /  AST  23  /  ALT  10  /  AlkPhos  80  01-23        RADIOLOGY & ADDITIONAL TESTS:  Results Reviewed: hypomagnesemia and hypophosphatemia repleted, dimer downtrending  Imaging Personally Reviewed:  Electrocardiogram Personally Reviewed:    COORDINATION OF CARE:  Care Discussed with Consultants/Other Providers [Y]: medicine SAURABH Plata  Prior or Outpatient Records Reviewed [Y/N]:   [facility] Division of Hospital Medicine  Alice Daniels MD  Pager (M-F, 0Y-8Y): [phone]  Other Times:  [phone]      Patient is a 71y old  Female who presents with a chief complaint of AMS, r/o dissection (23 Jan 2022 10:18)      SUBJECTIVE / OVERNIGHT EVENTS:  used for translation. complaining of LLE pain today otherwise no issues other than wanting to walk with PT. needs constant redirection. sonographer was bedside and informed verbally has both venous and arterial proximal LLE VTE - official report pending.  ADDITIONAL REVIEW OF SYSTEMS:    MEDICATIONS  (STANDING):  amLODIPine   Tablet 10 milliGRAM(s) Oral daily  aspirin  chewable 81 milliGRAM(s) Oral daily  atorvastatin 40 milliGRAM(s) Oral at bedtime  dextrose 40% Gel 15 Gram(s) Oral once  dextrose 5%. 1000 milliLiter(s) (100 mL/Hr) IV Continuous <Continuous>  dextrose 50% Injectable 25 Gram(s) IV Push once  enoxaparin Injectable 60 milliGRAM(s) SubCutaneous two times a day  glucagon  Injectable 1 milliGRAM(s) IntraMuscular once  insulin lispro (ADMELOG) corrective regimen sliding scale   SubCutaneous three times a day before meals  labetalol 100 milliGRAM(s) Oral three times a day  losartan 50 milliGRAM(s) Oral daily  magnesium sulfate  IVPB 2 Gram(s) IV Intermittent once  nicotine - 21 mG/24Hr(s) Patch 1 patch Transdermal daily  potassium phosphate IVPB 30 milliMole(s) IV Intermittent once  remdesivir  IVPB   IV Intermittent   remdesivir  IVPB 100 milliGRAM(s) IV Intermittent every 24 hours    MEDICATIONS  (PRN):  acetaminophen     Tablet .. 650 milliGRAM(s) Oral every 6 hours PRN Temp greater or equal to 38C (100.4F), Mild Pain (1 - 3)  acetaminophen  Suppository .. 650 milliGRAM(s) Rectal every 4 hours PRN Temp greater or equal to 38.5C (101.3F)  ALBUTerol    90 MICROgram(s) HFA Inhaler 2 Puff(s) Inhalation every 4 hours PRN Shortness of Breath and/or Wheezing  aluminum hydroxide/magnesium hydroxide/simethicone Suspension 30 milliLiter(s) Oral every 4 hours PRN Dyspepsia  benzonatate 100 milliGRAM(s) Oral three times a day PRN Cough  guaifenesin/dextromethorphan Oral Liquid 10 milliLiter(s) Oral every 4 hours PRN Cough  melatonin 3 milliGRAM(s) Oral at bedtime PRN Insomnia  ondansetron Injectable 4 milliGRAM(s) IV Push every 8 hours PRN Nausea and/or Vomiting      CAPILLARY BLOOD GLUCOSE      POCT Blood Glucose.: 147 mg/dL (23 Jan 2022 13:00)  POCT Blood Glucose.: 131 mg/dL (23 Jan 2022 08:13)  POCT Blood Glucose.: 140 mg/dL (22 Jan 2022 21:57)  POCT Blood Glucose.: 171 mg/dL (22 Jan 2022 17:11)    I&O's Summary    22 Jan 2022 07:01  -  23 Jan 2022 07:00  --------------------------------------------------------  IN: 240 mL / OUT: 1500 mL / NET: -1260 mL        PHYSICAL EXAM:  Vital Signs Last 24 Hrs  T(C): 36.4 (23 Jan 2022 09:15), Max: 36.9 (22 Jan 2022 21:12)  T(F): 97.6 (23 Jan 2022 09:15), Max: 98.5 (23 Jan 2022 05:00)  HR: 55 (23 Jan 2022 09:15) (55 - 60)  BP: 153/77 (23 Jan 2022 09:15) (153/77 - 169/90)  BP(mean): --  RR: 18 (23 Jan 2022 09:15) (18 - 18)  SpO2: 96% (23 Jan 2022 09:15) (95% - 97%)    CONSTITUTIONAL: NAD, well-developed  EYES: PERRLA; conjunctiva and sclera clear  ENMT: Moist oral mucosa, no pharyngeal injection or exudates; normal dentition  NECK: Supple, no palpable masses; no thyromegaly  RESPIRATORY: Normal respiratory effort; lungs are clear to auscultation bilaterally  CARDIOVASCULAR: Regular rate and rhythm, normal S1 and S2, no murmur/rub/gallop; No lower extremity edema; Peripheral pulses are 2+ bilaterally  ABDOMEN: Soft, Nondistended, Nontender to palpation, normoactive bowel sounds  MUSCULOSKELETAL:  No clubbing or cyanosis of digits; no joint swelling or tenderness to palpation  PSYCH: A+O to person, place, and time; affect appropriate  NEUROLOGY: no gross sensory deficits, +left sided weakness (old CVA)  SKIN: No rashes; no palpable lesions    LABS:                        13.9   7.56  )-----------( 207      ( 22 Jan 2022 08:39 )             44.8     01-23    141  |  103  |  19  ----------------------------<  149<H>  3.7   |  25  |  0.76    Ca    11.2<H>      23 Jan 2022 11:25  Phos  1.9     01-23  Mg     1.7     01-23    TPro  7.7  /  Alb  3.8  /  TBili  0.3  /  DBili  x   /  AST  23  /  ALT  10  /  AlkPhos  80  01-23        RADIOLOGY & ADDITIONAL TESTS:  Results Reviewed: hypomagnesemia and hypophosphatemia repleted, dimer downtrending  Imaging Personally Reviewed:  1/23/21 CT Angio Chest:      IMPRESSION:  1.  No pulmonary embolism.  2.  Scattered groundglass opacities, can be secondary to lung injury   given the history of novel coronavirus.  3.  The aorta measures 4.5 cm just distal to takeoff of the left   subclavian artery.  4.  Likely thickening of the left ventricular myocardium. There is fluid   echocardiography is suggested if it is not been performed recently.  Electrocardiogram Personally Reviewed:    COORDINATION OF CARE:  Care Discussed with Consultants/Other Providers [Y]: medicine SAURABH Plata  Prior or Outpatient Records Reviewed [Y/N]:

## 2022-01-23 NOTE — PROGRESS NOTE ADULT - PROBLEM SELECTOR PLAN 4
initially hypoxic to 92% improved to 95% on RA  otherwise asymptomatic  - given chronic medical conditions, high d-dimer, and risk for progression, will treat with remdesivir while inpatient for up to 5 day course  - d-dimer elevation in 2000s qualifies patient for therapeutic lovenox 60mg BID  - trend inflamm markers q48-72 hours  - check CTA chest today to r/o PE -  we discussed at length bedside yest and today with  (RN as witness) necessity of these studies. is amenable to go now. initially hypoxic to 92% improved to 95% on RA  otherwise asymptomatic  - given chronic medical conditions, high d-dimer, and risk for progression, will treat with remdesivir while inpatient for up to 5 day course  - d-dimer elevation in 2000s qualifies patient for therapeutic lovenox 60mg BID  - trend inflammatory markers q48-72 hours  - CTA chest negative for PE, shows GGO consistent with known COVID infection

## 2022-01-23 NOTE — DIETITIAN INITIAL EVALUATION ADULT. - OTHER INFO
Pt states to have poor PO intake. States to only like [place] food involving a lot of rice and fried chicken. Denies nausea/vomiting/constipation/diarrhea. Last bowel movement yesterday per RN.     Dosing weight 63kg. no further weights noted per HIE.     Attempted diet education, pt refused. repeatedly saying that she only wanted Portuguese food. Refused oral supplements at this time.

## 2022-01-23 NOTE — PROGRESS NOTE ADULT - ASSESSMENT
72 yo F PMH DM2, ?cognitive impairment, CVA w/ residual L arm weakness tx from [facility] with for CTS eval of thoracic aortic aneurysm evaluation now medically managing, found to have UTI, COVID+, elevated d-dimer, hypercalcemia, and new venous+arterial VTE.

## 2022-01-23 NOTE — PROGRESS NOTE ADULT - PROBLEM SELECTOR PLAN 1
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

## 2022-01-23 NOTE — DIETITIAN INITIAL EVALUATION ADULT. - PERTINENT LABORATORY DATA
01-22 Na 144 mmol/L Glu 88 mg/dL K+ 3.7 mmol/L Cr  0.90 mg/dL BUN 30 mg/dL<H> Phos n/a   Alb 3.5 g/dL, Vitamin D 25OH 8.8 (L)  POCT Blood Glucose.: 131 mg/dL (01-23-22 @ 08:13)  POCT Blood Glucose.: 140 mg/dL (01-22-22 @ 21:57)  POCT Blood Glucose.: 171 mg/dL (01-22-22 @ 17:11)  A1C with Estimated Average Glucose Result: 9.2 % (01-21-22 @ 09:07)

## 2022-01-23 NOTE — DIETITIAN INITIAL EVALUATION ADULT. - CHIEF COMPLAINT
Per chart: "70 yo F PMH DM2, ?cognitive impairment, CVA w/ residual L arm weakness tx from [facility] with for CTS eval of thoracic aortic aneurysm evaluation now medically managing, found to have UTI, COVID+, elevated d-dimer, and hypercalcemia."

## 2022-01-23 NOTE — DIETITIAN INITIAL EVALUATION ADULT. - ORAL INTAKE PTA/DIET HISTORY
visited pt at bedside this morning. eats Swazi food at home and was eating well. Denies difficulty chewing / swallowing. does not check blood glucose, does not monitor carbohydrate intake. states to take DM medications, but cannot recall name. none noted in outpatient medications. Confirms NKFA. not taking vitamins/minerals/oral supplements PTA.

## 2022-01-24 LAB
GLUCOSE BLDC GLUCOMTR-MCNC: 139 MG/DL — HIGH (ref 70–99)
GLUCOSE BLDC GLUCOMTR-MCNC: 154 MG/DL — HIGH (ref 70–99)
GLUCOSE BLDC GLUCOMTR-MCNC: 180 MG/DL — HIGH (ref 70–99)
GLUCOSE BLDC GLUCOMTR-MCNC: 216 MG/DL — HIGH (ref 70–99)
PROT SERPL-MCNC: 7.2 G/DL — SIGNIFICANT CHANGE UP (ref 6–8.3)
PROT SERPL-MCNC: 7.2 G/DL — SIGNIFICANT CHANGE UP (ref 6–8.3)

## 2022-01-24 PROCEDURE — 99221 1ST HOSP IP/OBS SF/LOW 40: CPT

## 2022-01-24 PROCEDURE — 99233 SBSQ HOSP IP/OBS HIGH 50: CPT

## 2022-01-24 PROCEDURE — 99223 1ST HOSP IP/OBS HIGH 75: CPT

## 2022-01-24 RX ORDER — ATORVASTATIN CALCIUM 80 MG/1
40 TABLET, FILM COATED ORAL AT BEDTIME
Refills: 0 | Status: DISCONTINUED | OUTPATIENT
Start: 2022-01-24 | End: 2022-01-24

## 2022-01-24 RX ADMIN — LOSARTAN POTASSIUM 100 MILLIGRAM(S): 100 TABLET, FILM COATED ORAL at 08:46

## 2022-01-24 RX ADMIN — Medication 100 MILLIGRAM(S): at 08:46

## 2022-01-24 RX ADMIN — Medication 100 MILLIGRAM(S): at 22:54

## 2022-01-24 RX ADMIN — AMLODIPINE BESYLATE 10 MILLIGRAM(S): 2.5 TABLET ORAL at 08:46

## 2022-01-24 RX ADMIN — REMDESIVIR 500 MILLIGRAM(S): 5 INJECTION INTRAVENOUS at 12:27

## 2022-01-24 RX ADMIN — Medication 2: at 12:28

## 2022-01-24 NOTE — PROGRESS NOTE ADULT - NSPROGADDITIONALINFOA_GEN_ALL_CORE
.  Alice Daniels MD  Division of Hospital Medicine  [facility]   Pager: [phone]    Plan discussed with patient via , kandy Mcgarry via phone, vascular Cardiology Dr. Burciaga, and medicine NP Amber.

## 2022-01-24 NOTE — PROGRESS NOTE ADULT - PROBLEM SELECTOR PLAN 1
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

## 2022-01-24 NOTE — PROGRESS NOTE ADULT - PROBLEM SELECTOR PLAN 2
on verbal report from sonographer bedside, informed has both LLE proximal arterial and venous thrombosis  - c/w therapeutic lovenox  - LLE Duplex showing acute L distal femoral and popliteal veins and thrombosis of L popliteal artery  - Vascular Cardiology consult appreciated  - f/u ESTER b/l, arterial duplex b/l, and RLE venous duplex  - per Vas Cards, CT showing evidence of IVC filter in place on verbal report from sonographer bedside, informed has both LLE proximal arterial and venous thrombosis  - c/w therapeutic lovenox  - LLE Duplex showing acute L distal femoral and popliteal veins and thrombosis of L popliteal artery  - Vascular Cardiology consult appreciated  - f/u ESTER b/l, arterial duplex b/l, and RLE venous duplex  - per Vas Cards, CT showing evidence of IVC filter in place  - podiatry consulted, f/u recs - c/w therapeutic lovenox  - LLE Duplex showing acute L distal femoral and popliteal veins and thrombosis of L popliteal artery  - Vascular Cardiology consult appreciated  - f/u ESTER b/l, arterial duplex b/l, and RLE venous duplex  - per Vasc Cards, CT showing evidence of IVC filter in place  - podiatry consulted, f/u recs

## 2022-01-24 NOTE — PROGRESS NOTE ADULT - PROBLEM SELECTOR PLAN 6
- completed ceftriaxone 1g q24h x 3 days on 1/22  - urine cx growing >100K E. coli, pan-sensitive  - f/u blood cx - NGTD Sepsis secondary to acute UTI present on admission, now resolved.  - completed ceftriaxone 1g q24h x 3 days on 1/22  - urine cx growing >100K E. coli, pan-sensitive  - f/u blood cx - NGTD

## 2022-01-24 NOTE — PROGRESS NOTE ADULT - SUBJECTIVE AND OBJECTIVE BOX
[facility] Division of Hospital Medicine  Alice Daniels MD  Pager (M-F, 9H-2W): [phone]  Other Times:  [phone]      Patient is a 71y old  Female who presents with a chief complaint of AMS, r/o dissection (24 Jan 2022 12:19)      SUBJECTIVE / OVERNIGHT EVENTS: refusing her medications overnight and this morning. refusing labs. did eventually take her  BP meds later this morning.  used but patient "doesn't feel like talking" today so unable to obtain ROS, but did reiterate she needs to take her medications.  ADDITIONAL REVIEW OF SYSTEMS:    MEDICATIONS  (STANDING):  amLODIPine   Tablet 10 milliGRAM(s) Oral daily  aspirin  chewable 81 milliGRAM(s) Oral daily  atorvastatin 40 milliGRAM(s) Oral at bedtime  dextrose 40% Gel 15 Gram(s) Oral once  dextrose 5%. 1000 milliLiter(s) (100 mL/Hr) IV Continuous <Continuous>  dextrose 50% Injectable 25 Gram(s) IV Push once  enoxaparin Injectable 60 milliGRAM(s) SubCutaneous two times a day  glucagon  Injectable 1 milliGRAM(s) IntraMuscular once  insulin lispro (ADMELOG) corrective regimen sliding scale   SubCutaneous three times a day before meals  labetalol 100 milliGRAM(s) Oral three times a day  losartan 100 milliGRAM(s) Oral daily  magnesium sulfate  IVPB 2 Gram(s) IV Intermittent once  nicotine - 21 mG/24Hr(s) Patch 1 patch Transdermal daily  potassium phosphate IVPB 30 milliMole(s) IV Intermittent once    MEDICATIONS  (PRN):  acetaminophen     Tablet .. 650 milliGRAM(s) Oral every 6 hours PRN Temp greater or equal to 38C (100.4F), Mild Pain (1 - 3)  acetaminophen  Suppository .. 650 milliGRAM(s) Rectal every 4 hours PRN Temp greater or equal to 38.5C (101.3F)  ALBUTerol    90 MICROgram(s) HFA Inhaler 2 Puff(s) Inhalation every 4 hours PRN Shortness of Breath and/or Wheezing  aluminum hydroxide/magnesium hydroxide/simethicone Suspension 30 milliLiter(s) Oral every 4 hours PRN Dyspepsia  benzonatate 100 milliGRAM(s) Oral three times a day PRN Cough  guaifenesin/dextromethorphan Oral Liquid 10 milliLiter(s) Oral every 4 hours PRN Cough  melatonin 3 milliGRAM(s) Oral at bedtime PRN Insomnia  ondansetron Injectable 4 milliGRAM(s) IV Push every 8 hours PRN Nausea and/or Vomiting      CAPILLARY BLOOD GLUCOSE      POCT Blood Glucose.: 216 mg/dL (24 Jan 2022 11:30)  POCT Blood Glucose.: 154 mg/dL (24 Jan 2022 07:46)  POCT Blood Glucose.: 225 mg/dL (23 Jan 2022 16:54)    I&O's Summary    23 Jan 2022 07:01  -  24 Jan 2022 07:00  --------------------------------------------------------  IN: 118 mL / OUT: 2100 mL / NET: -1982 mL    24 Jan 2022 07:01  -  24 Jan 2022 13:32  --------------------------------------------------------  IN: 240 mL / OUT: 0 mL / NET: 240 mL        PHYSICAL EXAM:  Vital Signs Last 24 Hrs  T(C): 36.7 (24 Jan 2022 08:40), Max: 36.7 (24 Jan 2022 08:40)  T(F): 98 (24 Jan 2022 08:40), Max: 98 (24 Jan 2022 08:40)  HR: 74 (24 Jan 2022 08:40) (62 - 74)  BP: 175/100 (24 Jan 2022 08:40) (166/80 - 203/98)  BP(mean): --  RR: 18 (24 Jan 2022 08:40) (18 - 18)  SpO2: 97% (24 Jan 2022 08:40) (97% - 98%)    CONSTITUTIONAL: NAD, well-developed  EYES: PERRLA; conjunctiva and sclera clear  ENMT: Moist oral mucosa, no pharyngeal injection or exudates; normal dentition  NECK: Supple, no palpable masses; no thyromegaly  RESPIRATORY: Normal respiratory effort; lungs are clear to auscultation bilaterally  CARDIOVASCULAR: Regular rate and rhythm, normal S1 and S2, no murmur/rub/gallop; No lower extremity edema; Peripheral pulses are 2+ bilaterally  ABDOMEN: Soft, Nondistended, Nontender to palpation, normoactive bowel sounds  MUSCULOSKELETAL:  No clubbing or cyanosis of digits; no joint swelling or tenderness to palpation  PSYCH: A+O to person, place, and time; affect appropriate  NEUROLOGY: no gross sensory deficits, +left sided weakness LUE>LLE (old CVA)  SKIN: No rashes; no palpable lesions    LABS:    01-23    141  |  103  |  19  ----------------------------<  149<H>  3.7   |  25  |  0.76    Ca    11.2<H>      23 Jan 2022 11:25  Phos  1.9     01-23  Mg     1.7     01-23    TPro  7.2  /  Alb  x   /  TBili  x   /  DBili  x   /  AST  x   /  ALT  x   /  AlkPhos  x   01-23      RADIOLOGY & ADDITIONAL TESTS:  Results Reviewed:   Imaging Personally Reviewed:  1/24/22 LLE Duplex:  FINDINGS:    RIGHT:  Normal compressibility of the RIGHT common femoral, femoral and popliteal   veins.  Doppler examination shows normal spontaneous and phasic flow.  No RIGHT calf vein thrombosis is detected.    LEFT:  Acute deep vein thrombosis of the LEFT distal femoral and popliteal veins.  Thrombus is also identified within the left popliteal artery.  Normal compressibility of the LEFT common femoral vein with normal   spontaneous and phasic flow.    IMPRESSION:  Acute deep venous thrombosis: above the knee of the LEFT distal femoral   and popliteal veins.    Also noted is thrombosis of the left popliteal artery.    Electrocardiogram Personally Reviewed:    COORDINATION OF CARE:  Care Discussed with Consultants/Other Providers [Y]: Vascular Cardiology attending Dr. Burciaga, medicine NP Amber  Prior or Outpatient Records Reviewed [Y/N]:

## 2022-01-24 NOTE — PROGRESS NOTE ADULT - PROBLEM SELECTOR PLAN 4
initially hypoxic to 92% improved to 95% on RA  otherwise asymptomatic  - given chronic medical conditions, high d-dimer, and risk for progression, will treat with remdesivir while inpatient for up to 5 day course  - d-dimer elevation in 2000s qualifies patient for therapeutic lovenox 60mg BID  - trend inflammatory markers q48-72 hours  - CTA chest negative for PE, shows GGO consistent with known COVID infection

## 2022-01-24 NOTE — CONSULT NOTE ADULT - ASSESSMENT
1. LFV, L popliteal vein DVT  2. Thrombus distal to L SFA and L popliteal arteries   3. Ascending aortic aneurym 5cm  4. 3.4cm infrarenal aortic aneurysm   5. COVID     Plan:    1. c/w therapeutic Lovenox 1mg/kg BID for anticoagulation. Of note, CT revealed an IVC filter. Obtain both venous and arterial duplexes of b/l LE. Obtain ESTER  2. Podiatry eval   3. No surgical intervention for aortic aneurysms, BP control and outpatient monitoring  4. Management of COVID per primary team  1. LFV, L popliteal vein DVT  2. Thrombus distal to L SFA and L popliteal arteries   3. Ascending aortic aneurym 5cm  4. 3.4cm infrarenal aortic aneurysm   5. COVID     Plan:    1. c/w therapeutic Lovenox 1mg/kg BID for anticoagulation. Of note, CT revealed an IVC filter. Obtain both venous and arterial duplexes of b/l LE. Obtain ESTER  2. Podiatry eval   3. No surgical intervention for aortic aneurysms, BP control and outpatient monitoring  4. Management of COVID per primary team   5.  Antiplatelet therapy and statin therapy

## 2022-01-24 NOTE — CONSULT NOTE ADULT - SUBJECTIVE AND OBJECTIVE BOX
Patient is a 71y old  Female who presents with a chief complaint of AMS, r/o dissection (24 Jan 2022 13:32)      HPI:  71F with unknown PMH  transferred from [facility] for "symptomatic AAA." Unable to obtain history from pt has AAO x1 and no point of contact in chart. Most of history per ED note. Per ED note, pt found down by HHA after being unable to contact her for 4 days. At OSH, found to have 5cm thoracic aneurysm at aortic arch, also with mild in the aschending/descending thoracic region, but study was limited due to extravasation of contrast to RUE. CT also showing BL staghorn calculus with possible hydro, given Zosyn x1, ALDA, elevated troponin, and COVID+.    ED course: Hypoxic to 91% on RA. Zosyn, dex, labetalol given,     Pt seen by CT surgery in ED who deemed pt not needing emergent surgical intervention at this time (20 Jan 2022 09:00)    Podiatry consulted for left 2nd toe ischemic changes with eschar.    PAST MEDICAL & SURGICAL HISTORY:  Staghorn calculus    HTN (hypertension)    Thoracic aortic aneurysm    Surgical history unknown        MEDICATIONS  (STANDING):  amLODIPine   Tablet 10 milliGRAM(s) Oral daily  aspirin  chewable 81 milliGRAM(s) Oral daily  atorvastatin 40 milliGRAM(s) Oral at bedtime  dextrose 40% Gel 15 Gram(s) Oral once  dextrose 5%. 1000 milliLiter(s) (100 mL/Hr) IV Continuous <Continuous>  dextrose 50% Injectable 25 Gram(s) IV Push once  enoxaparin Injectable 60 milliGRAM(s) SubCutaneous two times a day  glucagon  Injectable 1 milliGRAM(s) IntraMuscular once  insulin lispro (ADMELOG) corrective regimen sliding scale   SubCutaneous three times a day before meals  labetalol 100 milliGRAM(s) Oral three times a day  losartan 100 milliGRAM(s) Oral daily  magnesium sulfate  IVPB 2 Gram(s) IV Intermittent once  nicotine - 21 mG/24Hr(s) Patch 1 patch Transdermal daily  potassium phosphate IVPB 30 milliMole(s) IV Intermittent once    MEDICATIONS  (PRN):  acetaminophen     Tablet .. 650 milliGRAM(s) Oral every 6 hours PRN Temp greater or equal to 38C (100.4F), Mild Pain (1 - 3)  acetaminophen  Suppository .. 650 milliGRAM(s) Rectal every 4 hours PRN Temp greater or equal to 38.5C (101.3F)  ALBUTerol    90 MICROgram(s) HFA Inhaler 2 Puff(s) Inhalation every 4 hours PRN Shortness of Breath and/or Wheezing  aluminum hydroxide/magnesium hydroxide/simethicone Suspension 30 milliLiter(s) Oral every 4 hours PRN Dyspepsia  benzonatate 100 milliGRAM(s) Oral three times a day PRN Cough  guaifenesin/dextromethorphan Oral Liquid 10 milliLiter(s) Oral every 4 hours PRN Cough  melatonin 3 milliGRAM(s) Oral at bedtime PRN Insomnia  ondansetron Injectable 4 milliGRAM(s) IV Push every 8 hours PRN Nausea and/or Vomiting      Allergies    Allergy Status Unknown    Intolerances        VITALS:    Vital Signs Last 24 Hrs  T(C): 36.7 (24 Jan 2022 08:40), Max: 36.7 (24 Jan 2022 08:40)  T(F): 98 (24 Jan 2022 08:40), Max: 98 (24 Jan 2022 08:40)  HR: 55 (24 Jan 2022 14:06) (55 - 74)  BP: 132/74 (24 Jan 2022 14:06) (132/74 - 203/98)  BP(mean): --  RR: 18 (24 Jan 2022 14:06) (18 - 18)  SpO2: 97% (24 Jan 2022 14:06) (97% - 98%)    LABS:        01-23    141  |  103  |  19  ----------------------------<  149<H>  3.7   |  25  |  0.76    Ca    11.2<H>      23 Jan 2022 11:25  Phos  1.9     01-23  Mg     1.7     01-23    TPro  7.2  /  Alb  x   /  TBili  x   /  DBili  x   /  AST  x   /  ALT  x   /  AlkPhos  x   01-23      CAPILLARY BLOOD GLUCOSE      POCT Blood Glucose.: 216 mg/dL (24 Jan 2022 11:30)  POCT Blood Glucose.: 154 mg/dL (24 Jan 2022 07:46)  POCT Blood Glucose.: 225 mg/dL (23 Jan 2022 16:54)          LOWER EXTREMITY PHYSICAL EXAM:    Vasular: DP/PT w/4, on R 0/4 on left CFT slightly delayed to left 2nd toe, TG wnl b/l  Neuro: Epicritic sensation wnl to level of foot b/l  Musculoskeletal/Ortho: hammertoes 2-5 bilat with pain on palp of toes  Skin: left foot 2nd toe gangrenous changes to distal plantar aspect of toe with full thickness eschar no signs of infection, toenails thickened and severely elongated bilat

## 2022-01-24 NOTE — CONSULT NOTE ADULT - ATTENDING COMMENTS
Patient with tiny eschar on second digit, non-palpable DP on the left.      1.  Keep on lovenox for now  2.  ESTER/PVR  3.  Arterial duplex bilaterally  4.  Venous duplex R leg  5.  Podiatry consult Dr. Martha Burciaga [phone]

## 2022-01-24 NOTE — CONSULT NOTE ADULT - ASSESSMENT
72 y/o male pt with left 2nd toe ischemic changes   - pt seen and evaluated  - left 2nd toe ischemic changes with eschar distally  - rec betadine and leaving open to air daily  - pt will need further vascular work up   - will follow up at a later date for nail debridement   - will monitor for further changes during this admission

## 2022-01-24 NOTE — PROGRESS NOTE ADULT - ASSESSMENT
72 yo F PMH DM2, ?cognitive impairment, CVA w/ residual L arm weakness tx from [facility] with for CTS eval of thoracic aortic aneurysm evaluation now medically managing, found to have UTI, COVID+, elevated d-dimer, hypercalcemia, and acute L distal femoral and popliteal veins and thrombosis of L popliteal artery.

## 2022-01-25 LAB
ALBUMIN SERPL ELPH-MCNC: 3.5 G/DL — SIGNIFICANT CHANGE UP (ref 3.3–5)
ALP SERPL-CCNC: 81 U/L — SIGNIFICANT CHANGE UP (ref 40–120)
ALT FLD-CCNC: 16 U/L — SIGNIFICANT CHANGE UP (ref 10–45)
ANION GAP SERPL CALC-SCNC: 11 MMOL/L — SIGNIFICANT CHANGE UP (ref 5–17)
AST SERPL-CCNC: 23 U/L — SIGNIFICANT CHANGE UP (ref 10–40)
BILIRUB SERPL-MCNC: 0.3 MG/DL — SIGNIFICANT CHANGE UP (ref 0.2–1.2)
BUN SERPL-MCNC: 26 MG/DL — HIGH (ref 7–23)
CALCIUM SERPL-MCNC: 10.8 MG/DL — HIGH (ref 8.4–10.5)
CHLORIDE SERPL-SCNC: 100 MMOL/L — SIGNIFICANT CHANGE UP (ref 96–108)
CO2 SERPL-SCNC: 25 MMOL/L — SIGNIFICANT CHANGE UP (ref 22–31)
CREAT SERPL-MCNC: 0.95 MG/DL — SIGNIFICANT CHANGE UP (ref 0.5–1.3)
CULTURE RESULTS: SIGNIFICANT CHANGE UP
CULTURE RESULTS: SIGNIFICANT CHANGE UP
GLUCOSE BLDC GLUCOMTR-MCNC: 162 MG/DL — HIGH (ref 70–99)
GLUCOSE BLDC GLUCOMTR-MCNC: 165 MG/DL — HIGH (ref 70–99)
GLUCOSE BLDC GLUCOMTR-MCNC: 243 MG/DL — HIGH (ref 70–99)
GLUCOSE SERPL-MCNC: 168 MG/DL — HIGH (ref 70–99)
HCT VFR BLD CALC: 43.7 % — SIGNIFICANT CHANGE UP (ref 34.5–45)
HGB BLD-MCNC: 14.3 G/DL — SIGNIFICANT CHANGE UP (ref 11.5–15.5)
MAGNESIUM SERPL-MCNC: 2 MG/DL — SIGNIFICANT CHANGE UP (ref 1.6–2.6)
MCHC RBC-ENTMCNC: 29.1 PG — SIGNIFICANT CHANGE UP (ref 27–34)
MCHC RBC-ENTMCNC: 32.7 GM/DL — SIGNIFICANT CHANGE UP (ref 32–36)
MCV RBC AUTO: 88.8 FL — SIGNIFICANT CHANGE UP (ref 80–100)
NRBC # BLD: 0 /100 WBCS — SIGNIFICANT CHANGE UP (ref 0–0)
PHOSPHATE SERPL-MCNC: 2.5 MG/DL — SIGNIFICANT CHANGE UP (ref 2.5–4.5)
PLATELET # BLD AUTO: 271 K/UL — SIGNIFICANT CHANGE UP (ref 150–400)
POTASSIUM SERPL-MCNC: 3.5 MMOL/L — SIGNIFICANT CHANGE UP (ref 3.5–5.3)
POTASSIUM SERPL-SCNC: 3.5 MMOL/L — SIGNIFICANT CHANGE UP (ref 3.5–5.3)
PROT SERPL-MCNC: 7.1 G/DL — SIGNIFICANT CHANGE UP (ref 6–8.3)
RBC # BLD: 4.92 M/UL — SIGNIFICANT CHANGE UP (ref 3.8–5.2)
RBC # FLD: 14.1 % — SIGNIFICANT CHANGE UP (ref 10.3–14.5)
SODIUM SERPL-SCNC: 136 MMOL/L — SIGNIFICANT CHANGE UP (ref 135–145)
SPECIMEN SOURCE: SIGNIFICANT CHANGE UP
SPECIMEN SOURCE: SIGNIFICANT CHANGE UP
WBC # BLD: 9.45 K/UL — SIGNIFICANT CHANGE UP (ref 3.8–10.5)
WBC # FLD AUTO: 9.45 K/UL — SIGNIFICANT CHANGE UP (ref 3.8–10.5)

## 2022-01-25 PROCEDURE — 99232 SBSQ HOSP IP/OBS MODERATE 35: CPT

## 2022-01-25 PROCEDURE — 99233 SBSQ HOSP IP/OBS HIGH 50: CPT

## 2022-01-25 RX ORDER — APIXABAN 2.5 MG/1
5 TABLET, FILM COATED ORAL EVERY 12 HOURS
Refills: 0 | Status: DISCONTINUED | OUTPATIENT
Start: 2022-02-01 | End: 2022-02-02

## 2022-01-25 RX ORDER — POTASSIUM CHLORIDE 20 MEQ
40 PACKET (EA) ORAL ONCE
Refills: 0 | Status: DISCONTINUED | OUTPATIENT
Start: 2022-01-25 | End: 2022-02-02

## 2022-01-25 RX ORDER — APIXABAN 2.5 MG/1
10 TABLET, FILM COATED ORAL EVERY 12 HOURS
Refills: 0 | Status: COMPLETED | OUTPATIENT
Start: 2022-01-25 | End: 2022-02-01

## 2022-01-25 RX ADMIN — Medication 100 MILLIGRAM(S): at 13:16

## 2022-01-25 RX ADMIN — Medication 1 PATCH: at 19:35

## 2022-01-25 RX ADMIN — LOSARTAN POTASSIUM 100 MILLIGRAM(S): 100 TABLET, FILM COATED ORAL at 08:09

## 2022-01-25 RX ADMIN — Medication 1 PATCH: at 09:41

## 2022-01-25 RX ADMIN — Medication 100 MILLIGRAM(S): at 08:09

## 2022-01-25 RX ADMIN — Medication 2: at 17:35

## 2022-01-25 RX ADMIN — Medication 100 MILLIGRAM(S): at 22:04

## 2022-01-25 RX ADMIN — AMLODIPINE BESYLATE 10 MILLIGRAM(S): 2.5 TABLET ORAL at 08:09

## 2022-01-25 RX ADMIN — ATORVASTATIN CALCIUM 40 MILLIGRAM(S): 80 TABLET, FILM COATED ORAL at 22:04

## 2022-01-25 RX ADMIN — Medication 81 MILLIGRAM(S): at 12:21

## 2022-01-25 RX ADMIN — APIXABAN 10 MILLIGRAM(S): 2.5 TABLET, FILM COATED ORAL at 17:34

## 2022-01-25 NOTE — PROGRESS NOTE ADULT - PROBLEM SELECTOR PLAN 6
Sepsis secondary to acute UTI present on admission, now resolved.  - completed ceftriaxone 1g q24h x 3 days on 1/22  - urine cx growing >100K E. coli, pan-sensitive  - f/u blood cx - NGTD

## 2022-01-25 NOTE — PROGRESS NOTE ADULT - ATTENDING COMMENTS
Podiatry for nail care and small eschar on left second digit  Continue lovenox for now  Await L arterial duplex      Patrica [phone] Podiatry for nail care and small eschar on left second digit  patient does not want lovenox, in this case, can switch to eliquis 10mg BID x 7 days, then 5mg BID thereafter  Await L arterial duplex      Patrica [phone]

## 2022-01-25 NOTE — PROGRESS NOTE ADULT - PROBLEM SELECTOR PLAN 2
- patient refusing lovenox, discussed with vasc cards attending - will switch to eliquis  - start eliquis 10mg BID x 7 days followed by 5mg BID   - LLE Duplex showing acute L distal femoral and popliteal veins and thrombosis of L popliteal artery  - Vascular Cardiology consult appreciated  - f/u L arterial duplex  - per Vasc Cards, CT showing evidence of IVC filter in place  - podiatry consult recs apreciated

## 2022-01-25 NOTE — PROGRESS NOTE ADULT - PROBLEM SELECTOR PLAN 9
DVT ppx: therapeutic lovenox given DVT  Dispo: [facility]. per nephew, patient lives alone and ambulates around her apartment with cane

## 2022-01-25 NOTE — PROGRESS NOTE ADULT - PROBLEM SELECTOR PLAN 1
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; no dissection  - seen by CT surgery in ED with no emergent interventions, plan for tight BP control  - SBP goal ~ 120s, overall improved  - c/w labetalol 100mg TID (hold for bradycardia)  - c/w amlodipine 10mg daily  - c/w losartan to 100mg daily  - f/u TTE  - Partially thrombosed infrarenal AAA 3.4 cm - outpt follow up  - advised smoking cessation

## 2022-01-25 NOTE — PROGRESS NOTE ADULT - ASSESSMENT
72 yo F PMH DM2, ?cognitive impairment, CVA w/ residual L arm weakness tx from [facility] with for CTS eval of thoracic aortic aneurysm evaluation now medically managing, found to have UTI, COVID+, elevated d-dimer, hypercalcemia, and acute L distal femoral and popliteal veins and thrombosis of L popliteal artery.

## 2022-01-25 NOTE — PROGRESS NOTE ADULT - ASSESSMENT
1. LFV, L popliteal vein DVT  2. Thrombus distal to L SFA and L popliteal arteries   3. Ascending aortic aneurym 5cm  4. 3.4cm infrarenal aortic aneurysm   5. COVID     Plan:    1. c/w therapeutic Lovenox 1mg/kg BID for anticoagulation. Of note, CT revealed an IVC filter. Obtain arterial duplex of b/l LE. Obtain ESTER  2. Podiatry eval   3. No surgical intervention for aortic aneurysms, BP control and outpatient monitoring  4. Management of COVID per primary team   5.  Antiplatelet therapy and statin therapy  1. LFV, L popliteal vein DVT  2. Thrombus distal to L SFA and L popliteal arteries   3. Ascending aortic aneurym 5cm  4. 3.4cm infrarenal aortic aneurysm   5. COVID     Plan:    1. c/w therapeutic Lovenox 1mg/kg BID for anticoagulation. Of note, CT revealed an IVC filter. Discussed with vascular lab, will perform Left arterial duplex  2. Podiatry eval   3. No surgical intervention for aortic aneurysms, BP control and outpatient monitoring  4. Management of COVID per primary team   5.  Antiplatelet therapy and statin therapy

## 2022-01-25 NOTE — PROGRESS NOTE ADULT - SUBJECTIVE AND OBJECTIVE BOX
[facility] Division of Hospital Medicine  Alice Daniels MD  Pager (M-F, 7D-9D): [phone]  Other Times:  [phone]      Patient is a 71y old  Female who presents with a chief complaint of AMS, r/o dissection (25 Jan 2022 10:47)      SUBJECTIVE / OVERNIGHT EVENTS:  used for translation. Refusing her lovenox and any injections. Repeatedly stating she wants to go home despite discussing at length the pending work-up.  ADDITIONAL REVIEW OF SYSTEMS:    MEDICATIONS  (STANDING):  amLODIPine   Tablet 10 milliGRAM(s) Oral daily  apixaban 10 milliGRAM(s) Oral every 12 hours  aspirin  chewable 81 milliGRAM(s) Oral daily  atorvastatin 40 milliGRAM(s) Oral at bedtime  dextrose 40% Gel 15 Gram(s) Oral once  dextrose 5%. 1000 milliLiter(s) (100 mL/Hr) IV Continuous <Continuous>  dextrose 50% Injectable 25 Gram(s) IV Push once  glucagon  Injectable 1 milliGRAM(s) IntraMuscular once  insulin lispro (ADMELOG) corrective regimen sliding scale   SubCutaneous three times a day before meals  labetalol 100 milliGRAM(s) Oral three times a day  losartan 100 milliGRAM(s) Oral daily  nicotine - 21 mG/24Hr(s) Patch 1 patch Transdermal daily    MEDICATIONS  (PRN):  acetaminophen     Tablet .. 650 milliGRAM(s) Oral every 6 hours PRN Temp greater or equal to 38C (100.4F), Mild Pain (1 - 3)  acetaminophen  Suppository .. 650 milliGRAM(s) Rectal every 4 hours PRN Temp greater or equal to 38.5C (101.3F)  ALBUTerol    90 MICROgram(s) HFA Inhaler 2 Puff(s) Inhalation every 4 hours PRN Shortness of Breath and/or Wheezing  aluminum hydroxide/magnesium hydroxide/simethicone Suspension 30 milliLiter(s) Oral every 4 hours PRN Dyspepsia  benzonatate 100 milliGRAM(s) Oral three times a day PRN Cough  guaifenesin/dextromethorphan Oral Liquid 10 milliLiter(s) Oral every 4 hours PRN Cough  melatonin 3 milliGRAM(s) Oral at bedtime PRN Insomnia  ondansetron Injectable 4 milliGRAM(s) IV Push every 8 hours PRN Nausea and/or Vomiting      CAPILLARY BLOOD GLUCOSE      POCT Blood Glucose.: 162 mg/dL (25 Jan 2022 11:43)  POCT Blood Glucose.: 165 mg/dL (25 Jan 2022 08:08)  POCT Blood Glucose.: 180 mg/dL (24 Jan 2022 22:03)  POCT Blood Glucose.: 139 mg/dL (24 Jan 2022 16:42)    I&O's Summary    24 Jan 2022 07:01  -  25 Jan 2022 07:00  --------------------------------------------------------  IN: 480 mL / OUT: 1350 mL / NET: -870 mL    25 Jan 2022 07:01  -  25 Jan 2022 15:04  --------------------------------------------------------  IN: 480 mL / OUT: 200 mL / NET: 280 mL        PHYSICAL EXAM:  Vital Signs Last 24 Hrs  T(C): 36.6 (25 Jan 2022 10:34), Max: 36.9 (24 Jan 2022 21:02)  T(F): 97.8 (25 Jan 2022 10:34), Max: 98.4 (24 Jan 2022 21:02)  HR: 71 (25 Jan 2022 13:17) (62 - 71)  BP: 125/75 (25 Jan 2022 14:01) (125/75 - 158/86)  BP(mean): --  RR: 18 (25 Jan 2022 10:34) (18 - 18)  SpO2: 95% (25 Jan 2022 10:34) (95% - 100%)    CONSTITUTIONAL: NAD, well-developed  EYES: PERRLA; conjunctiva and sclera clear  ENMT: Moist oral mucosa, no pharyngeal injection or exudates; normal dentition  NECK: Supple, no palpable masses; no thyromegaly  RESPIRATORY: Normal respiratory effort; lungs are clear to auscultation bilaterally  CARDIOVASCULAR: Regular rate and rhythm, normal S1 and S2, no murmur/rub/gallop; No lower extremity edema; Peripheral pulses are 2+ bilaterally  ABDOMEN: Soft, Nondistended, Nontender to palpation, normoactive bowel sounds  MUSCULOSKELETAL:  No clubbing or cyanosis of digits; no joint swelling or tenderness to palpation  PSYCH: A+O to person, place, and time; affect appropriate  NEUROLOGY: no gross sensory deficits, +left sided weakness LUE>LLE (old CVA)  SKIN: No rashes; no palpable lesions, +overgrown toenails    LABS:                        14.3   9.45  )-----------( 271      ( 25 Jan 2022 09:39 )             43.7     01-25    136  |  100  |  26<H>  ----------------------------<  168<H>  3.5   |  25  |  0.95    Ca    10.8<H>      25 Jan 2022 09:39  Phos  2.5     01-25  Mg     2.0     01-25    TPro  7.1  /  Alb  3.5  /  TBili  0.3  /  DBili  x   /  AST  23  /  ALT  16  /  AlkPhos  81  01-25        RADIOLOGY & ADDITIONAL TESTS:  Results Reviewed: no leukocytosis, H/H stable, Cr stable  Imaging Personally Reviewed:  Electrocardiogram Personally Reviewed:    COORDINATION OF CARE:  Care Discussed with Consultants/Other Providers [Y]: Vas Cards Attending Dr. Burciaga, medicine NP Wanda  Prior or Outpatient Records Reviewed [Y/N]:

## 2022-01-25 NOTE — PROGRESS NOTE ADULT - NSPROGADDITIONALINFOA_GEN_ALL_CORE
.  Alice Daniels MD  Division of Hospital Medicine  [facility]   Pager: [phone]    Plan discussed with patient via , kandy Mcgarry via phone on 1/24, vascular Cardiology Dr. Burciaga, and medicine NP Wanda.

## 2022-01-25 NOTE — PROGRESS NOTE ADULT - PROBLEM SELECTOR PLAN 4
initially hypoxic to 92% improved to 95% on RA  otherwise asymptomatic  - given chronic medical conditions, high d-dimer, and risk for progression, will treat with remdesivir while inpatient for up to 5 day course  - d-dimer elevation in 2000s - full a/c for DVT as well  - trend inflammatory markers q48-72 hours  - CTA chest negative for PE, shows GGO consistent with known COVID infection

## 2022-01-25 NOTE — PROGRESS NOTE ADULT - SUBJECTIVE AND OBJECTIVE BOX
Vascular Cardiology  Progress note  DIRECT SERVICE NUMBER: [phone]            EMAIL [email or web address]   OFFICE [phone]    CC:  Transferred from [facility] HISTORY:    No events overnight. Pt with pain over LLE. Denies chest pain, sob, or palpitations.     Allergies    Allergy Status Unknown    Intolerances    	    MEDICATIONS:  amLODIPine   Tablet 10 milliGRAM(s) Oral daily  aspirin  chewable 81 milliGRAM(s) Oral daily  enoxaparin Injectable 60 milliGRAM(s) SubCutaneous two times a day  labetalol 100 milliGRAM(s) Oral three times a day  losartan 100 milliGRAM(s) Oral daily      ALBUTerol    90 MICROgram(s) HFA Inhaler 2 Puff(s) Inhalation every 4 hours PRN  benzonatate 100 milliGRAM(s) Oral three times a day PRN  guaifenesin/dextromethorphan Oral Liquid 10 milliLiter(s) Oral every 4 hours PRN    acetaminophen     Tablet .. 650 milliGRAM(s) Oral every 6 hours PRN  acetaminophen  Suppository .. 650 milliGRAM(s) Rectal every 4 hours PRN  melatonin 3 milliGRAM(s) Oral at bedtime PRN  ondansetron Injectable 4 milliGRAM(s) IV Push every 8 hours PRN    aluminum hydroxide/magnesium hydroxide/simethicone Suspension 30 milliLiter(s) Oral every 4 hours PRN    atorvastatin 40 milliGRAM(s) Oral at bedtime  dextrose 40% Gel 15 Gram(s) Oral once  dextrose 50% Injectable 25 Gram(s) IV Push once  glucagon  Injectable 1 milliGRAM(s) IntraMuscular once  insulin lispro (ADMELOG) corrective regimen sliding scale   SubCutaneous three times a day before meals    dextrose 5%. 1000 milliLiter(s) IV Continuous <Continuous>      PAST MEDICAL & SURGICAL HISTORY:  Staghorn calculus    HTN (hypertension)    Thoracic aortic aneurysm    Surgical history unknown        FAMILY HISTORY:  No pertinent family history in first degree relatives        SOCIAL HISTORY:  unchanged    REVIEW OF SYSTEMS:  CONSTITUTIONAL: No fever or fatigue  ENT:  No difficulty hearing, tinnitus, vertigo; No sinus or throat pain  NECK: No pain or stiffness  RESPIRATORY:  No dyspnea or cough  CARDIOVASCULAR:  No chest pain or palpitations   GASTROINTESTINAL: No abdominal or epigastric pain. No nausea, vomiting, or hematemesis; No diarrhea or constipation. No melena or hematochezia.  GENITOURINARY: No dysuria, frequency, hematuria, or incontinence  NEUROLOGICAL: No headaches, memory loss, loss of strength, numbness, or tremors  LYMPH Nodes: No enlarged glands  ENDOCRINE: No heat or cold intolerance; No hair loss  MUSCULOSKELETAL: No joint pain or swelling; No muscle, back, or extremity pain  PSYCHIATRIC: No depression, anxiety, mood swings, or difficulty sleeping  HEME/LYMPH: No easy bruising, or bleeding gums  ALLERGY AND IMMUNOLOGIC: No hives or eczema	    [ x] All others negative	  [ ] Unable to obtain    PHYSICAL EXAM:  T(C): 36.6 (01-25-22 @ 10:34), Max: 36.9 (01-24-22 @ 21:02)  HR: 62 (01-25-22 @ 10:34) (55 - 71)  BP: 130/66 (01-25-22 @ 10:34) (130/66 - 158/86)  RR: 18 (01-25-22 @ 10:34) (18 - 18)  SpO2: 95% (01-25-22 @ 10:34) (95% - 100%)  Wt(kg): --  I&O's Summary    24 Jan 2022 07:01  -  25 Jan 2022 07:00  --------------------------------------------------------  IN: 480 mL / OUT: 1350 mL / NET: -870 mL    25 Jan 2022 07:01  -  25 Jan 2022 10:48  --------------------------------------------------------  IN: 240 mL / OUT: 0 mL / NET: 240 mL    Appearance:  	  HEENT:   Normal oral mucosa  Lymphatic: No lymphadenopathy  Cardiovascular: S1, S2, RRR  Respiratory: CTAB  Psychiatry:  normal affect   Gastrointestinal:  Soft, Non-tender, + BS	  Skin: No rashes, No ecchymoses, No cyanosis	  Neurologic:  non focal  Extremities:  No LE edema     Vascular Pulse Exam:  Right DP: [x]palpable []non-palpable []audible      Left DP :   [ ]palpable [x]non-palpable []audible       Foot Exam:  L 2nd digit eschar       LABS:	 	    CBC Full  -  ( 25 Jan 2022 09:39 )  WBC Count : 9.45 K/uL  Hemoglobin : 14.3 g/dL  Hematocrit : 43.7 %  Platelet Count - Automated : 271 K/uL  Mean Cell Volume : 88.8 fl  Mean Cell Hemoglobin : 29.1 pg  Mean Cell Hemoglobin Concentration : 32.7 gm/dL  Auto Neutrophil # : x  Auto Lymphocyte # : x  Auto Monocyte # : x  Auto Eosinophil # : x  Auto Basophil # : x  Auto Neutrophil % : x  Auto Lymphocyte % : x  Auto Monocyte % : x  Auto Eosinophil % : x  Auto Basophil % : x    01-25    136  |  100  |  26<H>  ----------------------------<  168<H>  3.5   |  25  |  0.95  01-23    141  |  103  |  19  ----------------------------<  149<H>  3.7   |  25  |  0.76    Ca    10.8<H>      25 Jan 2022 09:39  Ca    11.2<H>      23 Jan 2022 11:25  Phos  2.5     01-25  Phos  1.9     01-23  Mg     2.0     01-25  Mg     1.7     01-23    TPro  7.1  /  Alb  3.5  /  TBili  0.3  /  DBili  x   /  AST  23  /  ALT  16  /  AlkPhos  81  01-25  TPro  7.2  /  Alb  x   /  TBili  x   /  DBili  x   /  AST  x   /  ALT  x   /  AlkPhos  x   01-23

## 2022-01-26 LAB
% ALBUMIN: 47.8 % — SIGNIFICANT CHANGE UP
% ALPHA 1: 4.9 % — SIGNIFICANT CHANGE UP
% ALPHA 2: 12.9 % — SIGNIFICANT CHANGE UP
% BETA: 13.7 % — SIGNIFICANT CHANGE UP
% GAMMA: 20.7 % — SIGNIFICANT CHANGE UP
ALBUMIN SERPL ELPH-MCNC: 3 G/DL — LOW (ref 3.6–5.5)
ALBUMIN/GLOB SERPL ELPH: 0.9 RATIO — SIGNIFICANT CHANGE UP
ALPHA1 GLOB SERPL ELPH-MCNC: 0.3 G/DL — SIGNIFICANT CHANGE UP (ref 0.1–0.4)
ALPHA2 GLOB SERPL ELPH-MCNC: 0.8 G/DL — SIGNIFICANT CHANGE UP (ref 0.5–1)
ANION GAP SERPL CALC-SCNC: 11 MMOL/L — SIGNIFICANT CHANGE UP (ref 5–17)
B-GLOBULIN SERPL ELPH-MCNC: 0.9 G/DL — SIGNIFICANT CHANGE UP (ref 0.5–1)
BUN SERPL-MCNC: 26 MG/DL — HIGH (ref 7–23)
CALCIUM SERPL-MCNC: 10.8 MG/DL — HIGH (ref 8.4–10.5)
CHLORIDE SERPL-SCNC: 105 MMOL/L — SIGNIFICANT CHANGE UP (ref 96–108)
CO2 SERPL-SCNC: 25 MMOL/L — SIGNIFICANT CHANGE UP (ref 22–31)
CREAT SERPL-MCNC: 0.89 MG/DL — SIGNIFICANT CHANGE UP (ref 0.5–1.3)
GAMMA GLOBULIN: 1.3 G/DL — SIGNIFICANT CHANGE UP (ref 0.6–1.6)
GLUCOSE BLDC GLUCOMTR-MCNC: 151 MG/DL — HIGH (ref 70–99)
GLUCOSE BLDC GLUCOMTR-MCNC: 167 MG/DL — HIGH (ref 70–99)
GLUCOSE BLDC GLUCOMTR-MCNC: 170 MG/DL — HIGH (ref 70–99)
GLUCOSE BLDC GLUCOMTR-MCNC: 220 MG/DL — HIGH (ref 70–99)
GLUCOSE SERPL-MCNC: 147 MG/DL — HIGH (ref 70–99)
HCT VFR BLD CALC: 43.2 % — SIGNIFICANT CHANGE UP (ref 34.5–45)
HGB BLD-MCNC: 13.9 G/DL — SIGNIFICANT CHANGE UP (ref 11.5–15.5)
INTERPRETATION SERPL IFE-IMP: SIGNIFICANT CHANGE UP
MCHC RBC-ENTMCNC: 29.5 PG — SIGNIFICANT CHANGE UP (ref 27–34)
MCHC RBC-ENTMCNC: 32.2 GM/DL — SIGNIFICANT CHANGE UP (ref 32–36)
MCV RBC AUTO: 91.7 FL — SIGNIFICANT CHANGE UP (ref 80–100)
NRBC # BLD: 0 /100 WBCS — SIGNIFICANT CHANGE UP (ref 0–0)
PLATELET # BLD AUTO: 278 K/UL — SIGNIFICANT CHANGE UP (ref 150–400)
POTASSIUM SERPL-MCNC: 3.5 MMOL/L — SIGNIFICANT CHANGE UP (ref 3.5–5.3)
POTASSIUM SERPL-SCNC: 3.5 MMOL/L — SIGNIFICANT CHANGE UP (ref 3.5–5.3)
PROT PATTERN SERPL ELPH-IMP: SIGNIFICANT CHANGE UP
RBC # BLD: 4.71 M/UL — SIGNIFICANT CHANGE UP (ref 3.8–5.2)
RBC # FLD: 14.2 % — SIGNIFICANT CHANGE UP (ref 10.3–14.5)
SODIUM SERPL-SCNC: 141 MMOL/L — SIGNIFICANT CHANGE UP (ref 135–145)
WBC # BLD: 10.16 K/UL — SIGNIFICANT CHANGE UP (ref 3.8–10.5)
WBC # FLD AUTO: 10.16 K/UL — SIGNIFICANT CHANGE UP (ref 3.8–10.5)

## 2022-01-26 PROCEDURE — 93308 TTE F-UP OR LMTD: CPT | Mod: 26

## 2022-01-26 PROCEDURE — 93926 LOWER EXTREMITY STUDY: CPT | Mod: 26,LT

## 2022-01-26 PROCEDURE — 93321 DOPPLER ECHO F-UP/LMTD STD: CPT | Mod: 26

## 2022-01-26 PROCEDURE — 99232 SBSQ HOSP IP/OBS MODERATE 35: CPT

## 2022-01-26 RX ORDER — LABETALOL HCL 100 MG
100 TABLET ORAL EVERY 12 HOURS
Refills: 0 | Status: DISCONTINUED | OUTPATIENT
Start: 2022-01-26 | End: 2022-02-02

## 2022-01-26 RX ORDER — POTASSIUM CHLORIDE 20 MEQ
40 PACKET (EA) ORAL ONCE
Refills: 0 | Status: COMPLETED | OUTPATIENT
Start: 2022-01-26 | End: 2022-01-26

## 2022-01-26 RX ORDER — LABETALOL HCL 100 MG
100 TABLET ORAL EVERY 12 HOURS
Refills: 0 | Status: DISCONTINUED | OUTPATIENT
Start: 2022-01-26 | End: 2022-01-26

## 2022-01-26 RX ADMIN — Medication 100 MILLIGRAM(S): at 06:09

## 2022-01-26 RX ADMIN — APIXABAN 10 MILLIGRAM(S): 2.5 TABLET, FILM COATED ORAL at 06:09

## 2022-01-26 RX ADMIN — Medication 1: at 08:39

## 2022-01-26 RX ADMIN — Medication 40 MILLIEQUIVALENT(S): at 12:00

## 2022-01-26 RX ADMIN — Medication 1 PATCH: at 12:24

## 2022-01-26 RX ADMIN — Medication 1 PATCH: at 07:00

## 2022-01-26 RX ADMIN — LOSARTAN POTASSIUM 100 MILLIGRAM(S): 100 TABLET, FILM COATED ORAL at 07:23

## 2022-01-26 RX ADMIN — Medication 81 MILLIGRAM(S): at 12:24

## 2022-01-26 RX ADMIN — Medication 1 PATCH: at 19:30

## 2022-01-26 RX ADMIN — AMLODIPINE BESYLATE 10 MILLIGRAM(S): 2.5 TABLET ORAL at 06:09

## 2022-01-26 RX ADMIN — ATORVASTATIN CALCIUM 40 MILLIGRAM(S): 80 TABLET, FILM COATED ORAL at 22:10

## 2022-01-26 RX ADMIN — Medication 1: at 12:23

## 2022-01-26 RX ADMIN — Medication 2: at 17:11

## 2022-01-26 RX ADMIN — Medication 3 MILLIGRAM(S): at 22:10

## 2022-01-26 RX ADMIN — APIXABAN 10 MILLIGRAM(S): 2.5 TABLET, FILM COATED ORAL at 17:11

## 2022-01-26 NOTE — PROGRESS NOTE ADULT - PROBLEM SELECTOR PLAN 9
DVT ppx: eliquis  Dispo: [facility]. per nephew, patient lives alone and ambulates around her apartment with cane

## 2022-01-26 NOTE — PROGRESS NOTE ADULT - PROBLEM SELECTOR PLAN 3
asymptomatic and Ca<12  - improved s/p IV hydration  - iPTH elevated, with low vit D 25-OH. suspect primary hyperparathyroidism with concomitant low vit D  - f/u 24 hr urinary calcium  - check parathyroid US  - SPEP wnl  - f/u UPEP asymptomatic and Ca<12  - improved s/p IV hydration  - iPTH elevated, with low vit D 25-OH. suspect primary hyperparathyroidism with concomitant low vit D  - f/u 24 hr urinary calcium  - check parathyroid US  - SPEP, immunofixation wnl  - f/u UPEP

## 2022-01-26 NOTE — PROGRESS NOTE ADULT - ASSESSMENT
1. LFV, L popliteal vein DVT  2. Thrombus distal to L SFA and L popliteal arteries   3. Ascending aortic aneurym 5cm  4. 3.4cm infrarenal aortic aneurysm   5. COVID     Plan:    1. c/w therapeutic Lovenox 1mg/kg BID for anticoagulation. Of note, CT revealed an IVC filter. Discussed with vascular lab, will perform Left arterial duplex  2. Podiatry eval   3. No surgical intervention for aortic aneurysms, BP control and outpatient monitoring  4. Management of COVID per primary team   5.  Antiplatelet therapy and statin therapy  1. LFV, L popliteal vein DVT  2. Thrombus distal to L SFA and L popliteal arteries   3. Ascending aortic aneurym 5cm  4. 3.4cm infrarenal aortic aneurysm   5. COVID     Plan:    1. Transitioned to eliquis, c/w load of 10mg BID x 7 days and then 5mg BID afterwards. Of note, CT revealed an IVC filter. Discussed with vascular lab, will perform arterial duplex  2. Podiatry eval   3. No surgical intervention for aortic aneurysms, BP control and outpatient monitoring  4. Management of COVID per primary team   5.  Antiplatelet therapy and statin therapy

## 2022-01-26 NOTE — PROGRESS NOTE ADULT - ASSESSMENT
70 yo F PMH DM2, ?cognitive impairment, CVA w/ residual L arm weakness tx from [facility] with for CTS eval of thoracic aortic aneurysm evaluation now medically managing, found to have UTI, COVID+, elevated d-dimer, hypercalcemia, and acute L distal femoral and popliteal veins and thrombosis of L popliteal artery.

## 2022-01-26 NOTE — PROGRESS NOTE ADULT - ATTENDING COMMENTS
Continue medical therapy with eliquis as planned  Once she recovers and is discharged, she can see us in the office to discuss role for peripheral angiogram / intervention of the left SFA/pop  No plans for peripheral intervention during this hospital stay    Patrica [phone]

## 2022-01-26 NOTE — PROGRESS NOTE ADULT - NSPROGADDITIONALINFOA_GEN_ALL_CORE
.  Alice Daniels MD  Division of Hospital Medicine  [facility]   Pager: [phone]    Plan discussed with patient via  and medicine NP Wanda. .  Alice Daniels MD  Division of Hospital Medicine  [facility]   Pager: [phone]    Plan discussed with patient via , kandy Mcgarry via phone, and medicine NP Wanda.

## 2022-01-26 NOTE — PROGRESS NOTE ADULT - PROBLEM SELECTOR PLAN 4
initially hypoxic to 92% improved to 95% on RA  otherwise asymptomatic  - given chronic medical conditions, high d-dimer, and risk for progression, will treat with remdesivir while inpatient for up to 5 day course which she completed  - d-dimer elevation in 2000s - full a/c for DVT as well  - trend inflammatory markers q48-72 hours  - CTA chest negative for PE, shows GGO consistent with known COVID infection

## 2022-01-26 NOTE — PROGRESS NOTE ADULT - SUBJECTIVE AND OBJECTIVE BOX
Vascular Cardiology  Progress note  DIRECT SERVICE NUMBER: [phone]            EMAIL [email or web address]   OFFICE [phone]    CC: transfer from [facility]    INTERVAL HISTORY:  No events overnight. Pt with pain over LLE, denies chest pain, sob, or palpitations.        Allergies    Allergy Status Unknown    Intolerances    	    MEDICATIONS:  amLODIPine   Tablet 10 milliGRAM(s) Oral daily  apixaban 10 milliGRAM(s) Oral every 12 hours  aspirin  chewable 81 milliGRAM(s) Oral daily  labetalol 100 milliGRAM(s) Oral every 12 hours  losartan 100 milliGRAM(s) Oral daily      ALBUTerol    90 MICROgram(s) HFA Inhaler 2 Puff(s) Inhalation every 4 hours PRN  benzonatate 100 milliGRAM(s) Oral three times a day PRN  guaifenesin/dextromethorphan Oral Liquid 10 milliLiter(s) Oral every 4 hours PRN    acetaminophen     Tablet .. 650 milliGRAM(s) Oral every 6 hours PRN  acetaminophen  Suppository .. 650 milliGRAM(s) Rectal every 4 hours PRN  melatonin 3 milliGRAM(s) Oral at bedtime PRN  ondansetron Injectable 4 milliGRAM(s) IV Push every 8 hours PRN    aluminum hydroxide/magnesium hydroxide/simethicone Suspension 30 milliLiter(s) Oral every 4 hours PRN    atorvastatin 40 milliGRAM(s) Oral at bedtime  dextrose 40% Gel 15 Gram(s) Oral once  dextrose 50% Injectable 25 Gram(s) IV Push once  glucagon  Injectable 1 milliGRAM(s) IntraMuscular once  insulin lispro (ADMELOG) corrective regimen sliding scale   SubCutaneous three times a day before meals    dextrose 5%. 1000 milliLiter(s) IV Continuous <Continuous>  potassium chloride    Tablet ER 40 milliEquivalent(s) Oral once  potassium chloride    Tablet ER 40 milliEquivalent(s) Oral once      PAST MEDICAL & SURGICAL HISTORY:  Staghorn calculus    HTN (hypertension)    Thoracic aortic aneurysm    Surgical history unknown        FAMILY HISTORY:  No pertinent family history in first degree relatives        SOCIAL HISTORY:  unchanged    REVIEW OF SYSTEMS:  CONSTITUTIONAL: No fever or fatigue  ENT:  No difficulty hearing, tinnitus, vertigo; No sinus or throat pain  NECK: No pain or stiffness  RESPIRATORY:  No dyspnea or cough  CARDIOVASCULAR:  No chest pain or palpitations   GASTROINTESTINAL: No abdominal or epigastric pain. No nausea, vomiting, or hematemesis; No diarrhea or constipation. No melena or hematochezia.  GENITOURINARY: No dysuria, frequency, hematuria, or incontinence  NEUROLOGICAL: No headaches, memory loss, loss of strength, numbness, or tremors  LYMPH Nodes: No enlarged glands  ENDOCRINE: No heat or cold intolerance; No hair loss  MUSCULOSKELETAL: No joint pain or swelling; No muscle, back, or extremity pain  PSYCHIATRIC: No depression, anxiety, mood swings, or difficulty sleeping  HEME/LYMPH: No easy bruising, or bleeding gums  ALLERGY AND IMMUNOLOGIC: No hives or eczema	    [ x] All others negative	  [ ] Unable to obtain    PHYSICAL EXAM:  T(C): 36.8 (01-26-22 @ 04:08), Max: 37.1 (01-25-22 @ 20:47)  HR: 50 (01-26-22 @ 04:08) (50 - 71)  BP: 115/63 (01-26-22 @ 04:08) (115/63 - 145/74)  RR: 18 (01-26-22 @ 04:08) (18 - 18)  SpO2: 96% (01-26-22 @ 04:08) (95% - 96%)  Wt(kg): --  I&O's Summary    25 Jan 2022 07:01  -  26 Jan 2022 07:00  --------------------------------------------------------  IN: 480 mL / OUT: 500 mL / NET: -20 mL    26 Jan 2022 07:01  -  26 Jan 2022 10:31  --------------------------------------------------------  IN: 480 mL / OUT: 0 mL / NET: 480 mL        Appearance:  NAD	  HEENT:   Normal oral mucosa  Lymphatic: No lymphadenopathy  Cardiovascular: S1, S2, RRR  Respiratory: CTAB  Psychiatry:  normal affect   Gastrointestinal:  Soft, Non-tender, + BS	  Skin: No rashes, No ecchymoses, No cyanosis	  Neurologic:  non focal  Extremities:  No LE edema     Vascular Pulse Exam:  Right DP: [x]palpable []non-palpable []audible      Left DP :   [ ]palpable [x]non-palpable []audible       Foot Exam:  L 2nd digit eschar       LABS:	 	    CBC Full  -  ( 26 Jan 2022 06:36 )  WBC Count : 10.16 K/uL  Hemoglobin : 13.9 g/dL  Hematocrit : 43.2 %  Platelet Count - Automated : 278 K/uL  Mean Cell Volume : 91.7 fl  Mean Cell Hemoglobin : 29.5 pg  Mean Cell Hemoglobin Concentration : 32.2 gm/dL  Auto Neutrophil # : x  Auto Lymphocyte # : x  Auto Monocyte # : x  Auto Eosinophil # : x  Auto Basophil # : x  Auto Neutrophil % : x  Auto Lymphocyte % : x  Auto Monocyte % : x  Auto Eosinophil % : x  Auto Basophil % : x    01-26    141  |  105  |  26<H>  ----------------------------<  147<H>  3.5   |  25  |  0.89  01-25    136  |  100  |  26<H>  ----------------------------<  168<H>  3.5   |  25  |  0.95    Ca    10.8<H>      26 Jan 2022 06:36  Ca    10.8<H>      25 Jan 2022 09:39  Phos  2.5     01-25  Mg     2.0     01-25    TPro  7.1  /  Alb  3.5  /  TBili  0.3  /  DBili  x   /  AST  23  /  ALT  16  /  AlkPhos  81  01-25

## 2022-01-26 NOTE — PROGRESS NOTE ADULT - PROBLEM SELECTOR PLAN 6
Sepsis secondary to acute UTI present on admission, now resolved.  - completed ceftriaxone 1g q24h x 3 days on 1/22  - urine cx growing >100K E. coli, pan-sensitive  - blood cultures negative - final

## 2022-01-26 NOTE — PROGRESS NOTE ADULT - PROBLEM SELECTOR PLAN 1
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; no dissection  - seen by CT surgery in ED with no emergent interventions, plan for tight BP control  - SBP goal ~ 120s, overall improved  - decrease labetalol to 100mg BID (hold for bradycardia)  - c/w amlodipine 10mg daily  - c/w losartan to 100mg daily  - f/u TTE  - Partially thrombosed infrarenal AAA 3.4 cm - outpt follow up  - advised smoking cessation

## 2022-01-26 NOTE — PROGRESS NOTE ADULT - PROBLEM SELECTOR PLAN 2
- patient refusing lovenox, discussed with vasc cards attending - will switch to eliquis  - start eliquis 10mg BID x 7 days followed by 5mg BID   - LLE Duplex showing acute L distal femoral and popliteal veins and thrombosis of L popliteal artery  - Vascular Cardiology consult appreciated  - f/u L arterial duplex  - per Vasc Cards, CT showing evidence of IVC filter in place  - podiatry consult recs appreciated

## 2022-01-26 NOTE — PROGRESS NOTE ADULT - SUBJECTIVE AND OBJECTIVE BOX
[facility] Division of Hospital Medicine  Alice Daniels MD  Pager (M-F, 1L-5O): [phone]  Other Times:  [phone]      Patient is a 71y old  Female who presents with a chief complaint of AMS, r/o dissection (26 Jan 2022 10:31)      SUBJECTIVE / OVERNIGHT EVENTS:  used for translation. no acute events overnight. has been compliant with medications. LLE extremity improved today.  agreed was very weak/deconditioned when working with PT. amenable for rehab when ready for discharge.   ADDITIONAL REVIEW OF SYSTEMS:    MEDICATIONS  (STANDING):  amLODIPine   Tablet 10 milliGRAM(s) Oral daily  apixaban 10 milliGRAM(s) Oral every 12 hours  aspirin  chewable 81 milliGRAM(s) Oral daily  atorvastatin 40 milliGRAM(s) Oral at bedtime  dextrose 40% Gel 15 Gram(s) Oral once  dextrose 5%. 1000 milliLiter(s) (100 mL/Hr) IV Continuous <Continuous>  dextrose 50% Injectable 25 Gram(s) IV Push once  glucagon  Injectable 1 milliGRAM(s) IntraMuscular once  insulin lispro (ADMELOG) corrective regimen sliding scale   SubCutaneous three times a day before meals  labetalol 100 milliGRAM(s) Oral every 12 hours  losartan 100 milliGRAM(s) Oral daily  nicotine - 21 mG/24Hr(s) Patch 1 patch Transdermal daily  potassium chloride    Tablet ER 40 milliEquivalent(s) Oral once    MEDICATIONS  (PRN):  acetaminophen     Tablet .. 650 milliGRAM(s) Oral every 6 hours PRN Temp greater or equal to 38C (100.4F), Mild Pain (1 - 3)  acetaminophen  Suppository .. 650 milliGRAM(s) Rectal every 4 hours PRN Temp greater or equal to 38.5C (101.3F)  ALBUTerol    90 MICROgram(s) HFA Inhaler 2 Puff(s) Inhalation every 4 hours PRN Shortness of Breath and/or Wheezing  aluminum hydroxide/magnesium hydroxide/simethicone Suspension 30 milliLiter(s) Oral every 4 hours PRN Dyspepsia  benzonatate 100 milliGRAM(s) Oral three times a day PRN Cough  guaifenesin/dextromethorphan Oral Liquid 10 milliLiter(s) Oral every 4 hours PRN Cough  melatonin 3 milliGRAM(s) Oral at bedtime PRN Insomnia  ondansetron Injectable 4 milliGRAM(s) IV Push every 8 hours PRN Nausea and/or Vomiting      CAPILLARY BLOOD GLUCOSE      POCT Blood Glucose.: 170 mg/dL (26 Jan 2022 11:41)  POCT Blood Glucose.: 167 mg/dL (26 Jan 2022 08:05)  POCT Blood Glucose.: 243 mg/dL (25 Jan 2022 16:47)    I&O's Summary    25 Jan 2022 07:01  -  26 Jan 2022 07:00  --------------------------------------------------------  IN: 480 mL / OUT: 500 mL / NET: -20 mL    26 Jan 2022 07:01  -  26 Jan 2022 13:57  --------------------------------------------------------  IN: 480 mL / OUT: 0 mL / NET: 480 mL        PHYSICAL EXAM:  Vital Signs Last 24 Hrs  T(C): 36.7 (26 Jan 2022 11:33), Max: 37.1 (25 Jan 2022 20:47)  T(F): 98 (26 Jan 2022 11:33), Max: 98.7 (25 Jan 2022 20:47)  HR: 52 (26 Jan 2022 11:33) (50 - 68)  BP: 124/67 (26 Jan 2022 11:33) (115/63 - 145/74)  BP(mean): --  RR: 18 (26 Jan 2022 11:33) (18 - 18)  SpO2: 94% (26 Jan 2022 11:33) (94% - 96%)    CONSTITUTIONAL: NAD, well-developed  EYES: PERRLA; conjunctiva and sclera clear  ENMT: Moist oral mucosa, no pharyngeal injection or exudates; normal dentition  NECK: Supple, no palpable masses; no thyromegaly  RESPIRATORY: Normal respiratory effort; lungs are clear to auscultation bilaterally  CARDIOVASCULAR: Regular rate and rhythm, normal S1 and S2, no murmur/rub/gallop; No lower extremity edema; Peripheral pulses are 2+ bilaterally  ABDOMEN: Soft, Nondistended, Nontender to palpation, normoactive bowel sounds  MUSCULOSKELETAL:  No clubbing or cyanosis of digits; no joint swelling or tenderness to palpation  PSYCH: A+O to person, place, and time; affect appropriate  NEUROLOGY: no gross sensory deficits, +left sided weakness LUE>LLE (old CVA)  SKIN: No rashes; no palpable lesions, +overgrown toenails      LABS:                        13.9   10.16 )-----------( 278      ( 26 Jan 2022 06:36 )             43.2     01-26    141  |  105  |  26<H>  ----------------------------<  147<H>  3.5   |  25  |  0.89    Ca    10.8<H>      26 Jan 2022 06:36  Phos  2.5     01-25  Mg     2.0     01-25    TPro  7.1  /  Alb  3.5  /  TBili  0.3  /  DBili  x   /  AST  23  /  ALT  16  /  AlkPhos  81  01-25        RADIOLOGY & ADDITIONAL TESTS:  Results Reviewed:  H/H stable, Cr stable  Imaging Personally Reviewed:  Electrocardiogram Personally Reviewed:    COORDINATION OF CARE:  Care Discussed with Consultants/Other Providers [Y]: medicine MARII Muñoz  Prior or Outpatient Records Reviewed [Y/N]:

## 2022-01-27 ENCOUNTER — TRANSCRIPTION ENCOUNTER (OUTPATIENT)
Age: 72
End: 2022-01-27

## 2022-01-27 LAB
GLUCOSE BLDC GLUCOMTR-MCNC: 142 MG/DL — HIGH (ref 70–99)
GLUCOSE BLDC GLUCOMTR-MCNC: 205 MG/DL — HIGH (ref 70–99)
GLUCOSE BLDC GLUCOMTR-MCNC: 210 MG/DL — HIGH (ref 70–99)
GLUCOSE BLDC GLUCOMTR-MCNC: 225 MG/DL — HIGH (ref 70–99)
PTH RELATED PROT SERPL-MCNC: <2 PMOL/L — SIGNIFICANT CHANGE UP

## 2022-01-27 PROCEDURE — 99232 SBSQ HOSP IP/OBS MODERATE 35: CPT

## 2022-01-27 RX ADMIN — Medication 81 MILLIGRAM(S): at 11:40

## 2022-01-27 RX ADMIN — LOSARTAN POTASSIUM 100 MILLIGRAM(S): 100 TABLET, FILM COATED ORAL at 06:07

## 2022-01-27 RX ADMIN — Medication 2: at 11:41

## 2022-01-27 RX ADMIN — ATORVASTATIN CALCIUM 40 MILLIGRAM(S): 80 TABLET, FILM COATED ORAL at 21:56

## 2022-01-27 RX ADMIN — Medication 1 PATCH: at 19:00

## 2022-01-27 RX ADMIN — AMLODIPINE BESYLATE 10 MILLIGRAM(S): 2.5 TABLET ORAL at 06:07

## 2022-01-27 RX ADMIN — Medication 100 MILLIGRAM(S): at 18:45

## 2022-01-27 RX ADMIN — Medication 1 PATCH: at 08:00

## 2022-01-27 RX ADMIN — APIXABAN 10 MILLIGRAM(S): 2.5 TABLET, FILM COATED ORAL at 06:07

## 2022-01-27 RX ADMIN — Medication 2: at 17:00

## 2022-01-27 RX ADMIN — Medication 100 MILLIGRAM(S): at 06:07

## 2022-01-27 RX ADMIN — Medication 1 PATCH: at 09:29

## 2022-01-27 RX ADMIN — APIXABAN 10 MILLIGRAM(S): 2.5 TABLET, FILM COATED ORAL at 18:46

## 2022-01-27 NOTE — DISCHARGE NOTE PROVIDER - NSFOLLOWUPCLINICS_GEN_ALL_ED_FT
[facility]  Geriatrics  [address]  [place] [address]  Phone: [phone]  Fax:   Follow Up Time: 1 week

## 2022-01-27 NOTE — PATIENT PROFILE ADULT - FALL HARM RISK - HARM RISK INTERVENTIONS
Assistance with ambulation/Assistance OOB with selected safe patient handling equipment/Communicate Risk of Fall with Harm to all staff/Discuss with provider need for PT consult/Monitor for mental status changes/Monitor gait and stability/Provide patient with walking aids - walker, cane, crutches/Reinforce activity limits and safety measures with patient and family/Tailored Fall Risk Interventions/Use of alarms - bed, chair and/or voice tab/Visual Cue: Yellow wristband and red socks/Bed in lowest position, wheels locked, appropriate side rails in place/Call bell, personal items and telephone in reach/Instruct patient to call for assistance before getting out of bed or chair/Non-slip footwear when patient is out of bed/[place] to call system/Physically safe environment - no spills, clutter or unnecessary equipment/Purposeful Proactive Rounding/Room/bathroom lighting operational, light cord in reach

## 2022-01-27 NOTE — PROGRESS NOTE ADULT - ASSESSMENT
1. LFV, L popliteal vein DVT  2. Thrombus distal to L SFA and L popliteal arteries   3. Ascending aortic aneurym 5cm  4. 3.4cm infrarenal aortic aneurysm   5. COVID     Plan:    1. Continue with eliquis as planned  2.  Podiatry for local nail care  3.  Medical management of PAD for now, she can see us in the office for management of PAD of the left leg  4.  No plans for any intervention at this moment  5.  Please offer moisturizer to the feet/legs    Patrica [phone]

## 2022-01-27 NOTE — PROGRESS NOTE ADULT - PROBLEM SELECTOR PLAN 4
initially hypoxic to 92% improved to 95% on RA  otherwise asymptomatic  - given chronic medical conditions, high d-dimer, and risk for progression, treated with remdesivir while inpatient x 5 day course which she completed  - d-dimer elevation in 2000s - full a/c for DVT as well  - trend inflammatory markers q48-72 hours  - CTA chest negative for PE, shows GGO consistent with known COVID infection

## 2022-01-27 NOTE — DISCHARGE NOTE PROVIDER - HOSPITAL COURSE
70 yo F PMH DM2, ?cognitive impairment, CVA w/ residual L arm weakness tx from [facility] with for CTS eval of thoracic aortic aneurysm evaluation now medically managing, found to have UTI, COVID+, elevated d-dimer, hypercalcemia, and acute L distal femoral and popliteal veins and thrombosis of L popliteal artery.      Problem/Plan - 1:  ·  Problem: Thoracic aortic aneurysm.   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     Problem/Plan - 2:  ·  Problem: Arterial thrombosis.   ·  Plan: - patient refusing lovenox, discussed with vasc cards attending - will switch to eliquis  - start eliquis 10mg BID x 7 days followed by 5mg BID   - LLE Duplex showing acute L distal femoral and popliteal veins and thrombosis of L popliteal artery  - Vascular Cardiology consult appreciated  - f/u L arterial duplex  - per Vasc Cards, CT showing evidence of IVC filter in place  - podiatry consult recs appreciated.     Problem/Plan - 3:  ·  Problem: Hypercalcemia.   ·  Plan: asymptomatic and Ca<12  - improved s/p IV hydration  - iPTH elevated, with low vit D 25-OH. suspect primary hyperparathyroidism with concomitant low vit D  - f/u 24 hr urinary calcium  - check parathyroid US  - SPEP, immunofixation wnl  - f/u UPEP.     Problem/Plan - 4:  ·  Problem: 2019 novel coronavirus disease (COVID-19).   ·  Plan: initially hypoxic to 92% improved to 95% on RA  otherwise asymptomatic  - given chronic medical conditions, high d-dimer, and risk for progression, will treat with remdesivir while inpatient for up to 5 day course which she completed  - d-dimer elevation in 2000s - full a/c for DVT as well  - trend inflammatory markers q48-72 hours  - CTA chest negative for PE, shows GGO consistent with known COVID infection.     Problem/Plan - 5:  ·  Problem: Cerebrovascular accident (CVA).   ·  Plan: left residual arm hemiparesis  - c/w ASA 81mg daily, atorvastatin 40mg qHS.     Problem/Plan - 6:  ·  Problem: Acute UTI.   ·  Plan: Sepsis secondary to acute UTI present on admission, now resolved.  - completed ceftriaxone 1g q24h x 3 days on 1/22  - urine cx growing >100K E. coli, pan-sensitive  - blood cultures negative - final.     Problem/Plan - 7:  ·  Problem: ALDA (acute kidney injury).   ·  Plan: prerenal, improved s/p IVF  - monitor Cr on BMP  - renally dose meds to GFR.     Problem/Plan - 8:  ·  Problem: Type 2 diabetes mellitus.   ·  Plan: HbA1c 9.2%. poorly uncontrolled.  - c/w sliding scale  - will add basal/bolus if needed  - will need to discharge home on oral regimen likely metformin.     Problem/Plan - 9:  ·  Problem: Prophylactic measure.   ·  Plan: DVT ppx: eliquis  Dispo: [facility] per nephew, patient lives alone and ambulates around her apartment with cane.     72 yo F PMH DM2, ?cognitive impairment, CVA w/ residual L arm weakness tx from [facility] with for CTS eval of thoracic aortic aneurysm evaluation now medically managing, found to have UTI, COVID+, elevated d-dimer, hypercalcemia, and acute L distal femoral and popliteal veins and thrombosis of L popliteal artery.      Problem/Plan - 1:  ·  Problem: Thoracic aortic aneurysm.   ·  Plan: 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; no dissection  - seen by CT surgery in ED with no emergent interventions, plan for tight BP control  - SBP goal ~ 120s, overall improved  - decrease labetalol to 100mg BID (hold for bradycardia)  - c/w amlodipine 10mg daily  - c/w losartan to 100mg daily  - Echo shows Moderate concentric left ventricular hypertrophy. Normal left ventricular systolic function. No segmental  wall motion abnormalities. Normal right ventricular size and function.  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     Problem/Plan - 2:  ·  Problem: Arterial thrombosis.   ·  Plan: - patient refusing lovenox, discussed with vasc cards attending - will switch to eliquis  - start eliquis 10mg BID x 7 days followed by 5mg BID   - LLE Duplex showing acute L distal femoral and popliteal veins and thrombosis of L popliteal artery  - Vascular Cardiology consult appreciated  - per Vasc Cards, CT showing evidence of IVC filter in place  - podiatry consult recs appreciated.     Problem/Plan - 3:  ·  Problem: Hypercalcemia.   ·  Plan: asymptomatic and Ca<12  - improved s/p IV hydration  - iPTH elevated, with low vit D 25-OH. suspect primary hyperparathyroidism with concomitant low vit D  - f/u 24 hr urinary calcium  - check parathyroid US  - SPEP, immunofixation wnl  - f/u UPEP.     Problem/Plan - 4:  ·  Problem: 2019 novel coronavirus disease (COVID-19).   ·  Plan: initially hypoxic to 92% improved to 95% on RA  otherwise asymptomatic  - given chronic medical conditions, high d-dimer, and risk for progression, will treat with remdesivir while inpatient for up to 5 day course which she completed  - d-dimer elevation in 2000s - full a/c for DVT as well  - trend inflammatory markers q48-72 hours  - CTA chest negative for PE, shows GGO consistent with known COVID infection.     Problem/Plan - 5:  ·  Problem: Cerebrovascular accident (CVA).   ·  Plan: left residual arm hemiparesis  - c/w ASA 81mg daily, atorvastatin 40mg qHS.     Problem/Plan - 6:  ·  Problem: Acute UTI.   ·  Plan: Sepsis secondary to acute UTI present on admission, now resolved.  - completed ceftriaxone 1g q24h x 3 days on 1/22  - urine cx growing >100K E. coli, pan-sensitive  - blood cultures negative - final.     Problem/Plan - 7:  ·  Problem: ALDA (acute kidney injury).   ·  Plan: prerenal, improved s/p IVF  - monitor Cr on BMP  - renally dose meds to GFR.     Problem/Plan - 8:  ·  Problem: Type 2 diabetes mellitus.   ·  Plan: HbA1c 9.2%. poorly uncontrolled.  - c/w sliding scale  - will add basal/bolus if needed  - will need to discharge home on oral regimen likely metformin.     Problem/Plan - 9:  ·  Problem: Prophylactic measure.   ·  Plan: DVT ppx: eliquis  Dispo: [facility]. per nephew, patient lives alone and ambulates around her apartment with cane.     72 yo F PMH DM2, ?cognitive impairment, CVA w/ residual L arm weakness tx from [facility] with for CTS eval of thoracic aortic aneurysm evaluation now medically managing, found to have UTI, COVID+, elevated d-dimer, hypercalcemia, and acute L distal femoral and popliteal veins and thrombosis of L popliteal artery.      Problem/Plan - 1:  ·  Problem: Thoracic aortic aneurysm.   ·  Plan: 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; no dissection  - seen by CT surgery in ED with no emergent interventions, plan for tight BP control  - SBP goal ~ 120s, overall improved  - decrease labetalol to 100mg BID (hold for bradycardia)  - c/w amlodipine 10mg daily  - c/w losartan to 100mg daily  - Echo shows Moderate concentric left ventricular hypertrophy. Normal left ventricular systolic function. No segmental  wall motion abnormalities. Normal right ventricular size and function.  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     Problem/Plan - 2:  ·  Problem: Arterial thrombosis.   ·  Plan: - patient refusing lovenox, discussed with vasc cards attending - will switch to eliquis  - start eliquis 10mg BID x 7 days followed by 5mg BID   - LLE Duplex showing acute L distal femoral and popliteal veins and thrombosis of L popliteal artery  - Vascular Cardiology consult appreciated  - per Vasc Cards, CT showing evidence of IVC filter in place  - podiatry consult recs appreciated.     Problem/Plan - 3:  ·  Problem: Hypercalcemia.   ·  Plan: asymptomatic and Ca<12  - improved s/p IV hydration  - iPTH elevated, with low vit D 25-OH. suspect primary hyperparathyroidism with concomitant low vit D  - f/u 24 hr urinary calcium  - check parathyroid US  - SPEP, immunofixation wnl  - f/u UPEP.     Problem/Plan - 4:  ·  Problem: 2019 novel coronavirus disease (COVID-19).   ·  Plan: initially hypoxic to 92% improved to 95% on RA  otherwise asymptomatic  - given chronic medical conditions, high d-dimer, and risk for progression, will treat with remdesivir while inpatient for up to 5 day course which she completed  - d-dimer elevation in 2000s - full a/c for DVT as well  - trend inflammatory markers q48-72 hours  - CTA chest negative for PE, shows GGO consistent with known COVID infection.     Problem/Plan - 5:  ·  Problem: Cerebrovascular accident (CVA).   ·  Plan: left residual arm hemiparesis  - c/w ASA 81mg daily, atorvastatin 40mg qHS.     Problem/Plan - 6:  ·  Problem: Acute UTI.   ·  Plan: Sepsis secondary to acute UTI present on admission, now resolved.  - completed ceftriaxone 1g q24h x 3 days on 1/22  - urine cx growing >100K E. coli, pan-sensitive  - blood cultures negative - final.     Problem/Plan - 7:  ·  Problem: ALDA (acute kidney injury).   ·  Plan: prerenal, improved s/p IVF  - monitor Cr on BMP  - renally dose meds to GFR.     Problem/Plan - 8:  ·  Problem: Type 2 diabetes mellitus.   ·  Plan: HbA1c 9.2%. poorly uncontrolled.  - c/w sliding scale  - will add basal/bolus if needed  - will need to discharge home on oral regimen likely metformin.     Problem/Plan - 9:  ·  Problem: Prophylactic measure.   ·  Plan: DVT ppx: eliquis  Dispo: [facility]. per nephew, patient lives alone and ambulates around her apartment with cane.      Discharge Diagnoses:  Transferred from [facility] for "symptomatic AAA."    # Sepsis secondary to acute UTI present on admission  # 2019 novel coronavirus disease (COVID-19)  # Thoracic aortic aneurysm  # Arterial thrombosis and acute DVT - acute L distal femoral and popliteal veins and thrombosis of L popliteal artery  # hypertension  # Hypercalcemia  # h/o Cerebrovascular accident (CVA) with left arm hemiparesis   # ALDA 2/2 prerenal azotemia   # Type 2 diabetes mellitus    The patient is medically stable for discharge to [facility]. Last couple of days the family was going back and forth between home and rehab (requiring auth), thus the delay in transition of care.

## 2022-01-27 NOTE — PROGRESS NOTE ADULT - PROBLEM SELECTOR PLAN 1
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; no dissection  - seen by CT surgery in ED with no emergent interventions, plan for tight BP control  - SBP goal ~ 120s, at goal when she does miss scheduled meds  - c/w labetalol to 100mg BID (hold for bradycardia)  - c/w amlodipine 10mg daily  - c/w losartan to 100mg daily  - TTE unremarkable  - Partially thrombosed infrarenal AAA 3.4 cm - outpt follow up  - advised smoking cessation

## 2022-01-27 NOTE — PROGRESS NOTE ADULT - PROBLEM SELECTOR PLAN 2
- patient refusing lovenox, discussed with vasc cards attending - will switch to eliquis  - start eliquis 10mg BID x 7 days followed by 5mg BID   - LLE Duplex showing acute L distal femoral and popliteal veins and thrombosis of L popliteal artery  - Vascular Cardiology consult appreciated  - L arterial duplex confirming PAD  - per Vasc Cards, CT showing evidence of IVC filter in place  - podiatry consult recs appreciated  - discussed with Dr. Burciaga, no plans for inpatient intervention, but will need to follow-up with Vascular Cardiology (Dr. Burciaga) to discuss revascularizatoin as outpatient - her nephew Zeferino is aware and will ensure he takes her to this follow-up

## 2022-01-27 NOTE — PROGRESS NOTE ADULT - PROBLEM SELECTOR PLAN 3
asymptomatic and Ca<12  - improved s/p IV hydration  - iPTH elevated, with low vit D 25-OH. suspect primary hyperparathyroidism with concomitant low vit D  - f/u 24 hr urinary calcium  - can check parathyroid US as outpatient - nephew aware  - SPEP, immunofixation wnl  - f/u UPEP - being collected

## 2022-01-27 NOTE — PROGRESS NOTE ADULT - SUBJECTIVE AND OBJECTIVE BOX
[facility] Division of Hospital Medicine  Alice Daniels MD  Pager (M-F, 2O-7N): [phone]  Other Times:  [phone]      Patient is a 71y old  Female who presents with a chief complaint of AMS, r/o dissection (27 Jan 2022 11:00)      SUBJECTIVE / OVERNIGHT EVENTS:  used for translation. no acute events overnight. unhappy with food choices and portions + being woken up for meds in AM but otherwise no complaints.  ADDITIONAL REVIEW OF SYSTEMS:    MEDICATIONS  (STANDING):  amLODIPine   Tablet 10 milliGRAM(s) Oral daily  apixaban 10 milliGRAM(s) Oral every 12 hours  aspirin  chewable 81 milliGRAM(s) Oral daily  atorvastatin 40 milliGRAM(s) Oral at bedtime  dextrose 40% Gel 15 Gram(s) Oral once  dextrose 5%. 1000 milliLiter(s) (100 mL/Hr) IV Continuous <Continuous>  dextrose 50% Injectable 25 Gram(s) IV Push once  glucagon  Injectable 1 milliGRAM(s) IntraMuscular once  insulin lispro (ADMELOG) corrective regimen sliding scale   SubCutaneous three times a day before meals  labetalol 100 milliGRAM(s) Oral every 12 hours  losartan 100 milliGRAM(s) Oral daily  nicotine - 21 mG/24Hr(s) Patch 1 patch Transdermal daily  potassium chloride    Tablet ER 40 milliEquivalent(s) Oral once    MEDICATIONS  (PRN):  acetaminophen     Tablet .. 650 milliGRAM(s) Oral every 6 hours PRN Temp greater or equal to 38C (100.4F), Mild Pain (1 - 3)  acetaminophen  Suppository .. 650 milliGRAM(s) Rectal every 4 hours PRN Temp greater or equal to 38.5C (101.3F)  ALBUTerol    90 MICROgram(s) HFA Inhaler 2 Puff(s) Inhalation every 4 hours PRN Shortness of Breath and/or Wheezing  aluminum hydroxide/magnesium hydroxide/simethicone Suspension 30 milliLiter(s) Oral every 4 hours PRN Dyspepsia  benzonatate 100 milliGRAM(s) Oral three times a day PRN Cough  guaifenesin/dextromethorphan Oral Liquid 10 milliLiter(s) Oral every 4 hours PRN Cough  melatonin 3 milliGRAM(s) Oral at bedtime PRN Insomnia  ondansetron Injectable 4 milliGRAM(s) IV Push every 8 hours PRN Nausea and/or Vomiting      CAPILLARY BLOOD GLUCOSE      POCT Blood Glucose.: 210 mg/dL (27 Jan 2022 11:32)  POCT Blood Glucose.: 142 mg/dL (27 Jan 2022 07:35)  POCT Blood Glucose.: 151 mg/dL (26 Jan 2022 21:45)  POCT Blood Glucose.: 220 mg/dL (26 Jan 2022 16:34)    I&O's Summary    26 Jan 2022 07:01  -  27 Jan 2022 07:00  --------------------------------------------------------  IN: 840 mL / OUT: 625 mL / NET: 215 mL    27 Jan 2022 07:01  -  27 Jan 2022 15:26  --------------------------------------------------------  IN: 840 mL / OUT: 200 mL / NET: 640 mL        PHYSICAL EXAM:  Vital Signs Last 24 Hrs  T(C): 36.6 (27 Jan 2022 11:11), Max: 37.1 (27 Jan 2022 05:15)  T(F): 97.9 (27 Jan 2022 11:11), Max: 98.7 (27 Jan 2022 05:15)  HR: 56 (27 Jan 2022 11:11) (54 - 61)  BP: 135/72 (27 Jan 2022 11:11) (135/72 - 160/77)  BP(mean): --  RR: 18 (27 Jan 2022 11:11) (18 - 18)  SpO2: 96% (27 Jan 2022 11:11) (96% - 96%)    CONSTITUTIONAL: NAD, well-developed  EYES: PERRLA; conjunctiva and sclera clear  ENMT: Moist oral mucosa, no pharyngeal injection or exudates; normal dentition  NECK: Supple, no palpable masses; no thyromegaly  RESPIRATORY: Normal respiratory effort; lungs are clear to auscultation bilaterally  CARDIOVASCULAR: Regular rate and rhythm, normal S1 and S2, no murmur/rub/gallop; No lower extremity edema; Peripheral pulses are 2+ bilaterally  ABDOMEN: Soft, Nondistended, Nontender to palpation, normoactive bowel sounds  MUSCULOSKELETAL:  No clubbing or cyanosis of digits; no joint swelling or tenderness to palpation  PSYCH: A+O to person, place, and time; affect appropriate  NEUROLOGY: no gross sensory deficits, +left sided weakness LUE>LLE (old CVA)  SKIN: No rashes; no palpable lesions, +overgrown toenails    LABS:                        13.9   10.16 )-----------( 278      ( 26 Jan 2022 06:36 )             43.2     01-26    141  |  105  |  26<H>  ----------------------------<  147<H>  3.5   |  25  |  0.89    Ca    10.8<H>      26 Jan 2022 06:36        RADIOLOGY & ADDITIONAL TESTS:  Results Reviewed:   Imaging Personally Reviewed:  1/26/22 L Arterial Duplex:  Findings:    Atheromatous plaque of varying severity impinges segmentally on the lumen   of the arteries supplying the left lower extremity.    There is a segmental occlusion of the left superficial femoral artery in   the distal thigh and a segmental stenosis of the left popliteal artery   with dampened arterial flow distally.    The peak systolic velocities of the Left lower extremity are as follows:    Common femoral artery: 138 cm/s  Deep femoral artery: 117 cm/s  Superficial femoral artery: 98 cm/s proximal,  56 cm/s mid, segmentally   OCCLUDED distal  Popliteal artery: 18, 94, 69 cm/s  Tibioperoneal trunk: Tardus parvus, 19 cm/s  Posterior tibial artery: 41 cm/s  Peroneal artery: 20 cm/s  Anterior tibial artery: 18 cm/s  Dorsalis pedis artery: 18 cm/s    Impression:    There is a segmental occlusion of the left distal superficial femoral   artery and a segmental stenosis of the left popliteal artery, with tardus   parvus flow through the posterior tibial peroneal trunk and trifurcation   arteries.    1/26/22 Limited TTE:  Observations:  Aortic Valve/Aorta:  Left Ventricle: Normal left ventricular systolic function.  No segmental wall motion abnormalities. Moderate concentric  left ventricular hypertrophy. Unable to evaluate diastolic  function  Right Heart: Mild right atrial enlargement. Normal right  ventricular size and function. Normal tricuspid valve. Mild  tricuspid regurgitation. No pulmonic regurgitation.  Pericardium/Pleura: Normal pericardium with no pericardial  effusion.  Hemodynamic: Estimated right ventricular systolic pressure  equals 20.44 - 24.44 mm Hg, assuming right atrial pressure  equals 6 - 10 mm Hg, consistent with normal pulmonary  hypertension.  ------------------------------------------------------------------------  Conclusions:  1. Moderate concentric left ventricular hypertrophy.  2. Normal left ventricular systolic function. No segmental  wall motion abnormalities.  3. Normal right ventricular size and function.  Electrocardiogram Personally Reviewed:    COORDINATION OF CARE:  Care Discussed with Consultants/Other Providers [Y/N]:  Prior or Outpatient Records Reviewed [Y/N]:

## 2022-01-27 NOTE — PROGRESS NOTE ADULT - NSPROGADDITIONALINFOA_GEN_ALL_CORE
.  Alice Daniels MD  Division of Hospital Medicine  [facility]   Pager: [phone]    DCP to [facility]. COVID-19 PCR to be sent today.    Plan discussed with patient via , kandy Mcgarry via phone on 1/26, and medicine NP Amber.

## 2022-01-27 NOTE — DISCHARGE NOTE PROVIDER - NSDCCPCAREPLAN_GEN_ALL_CORE_FT
PRINCIPAL DISCHARGE DIAGNOSIS  Diagnosis: COVID-19  Assessment and Plan of Treatment: Supportive care  Completed Remdesivir  Quarantine as directed  Follow-up with your Primary Care Doctor      SECONDARY DISCHARGE DIAGNOSES  Diagnosis: UTI (urinary tract infection)  Assessment and Plan of Treatment: HOME CARE INSTRUCTIONS  f you were prescribed antibiotics, take them exactly as your caregiver instructs you. Finish the medication even if you feel better after you have only taken some of the medication.  Drink enough water and fluids to keep your urine clear or pale yellow.  Avoid caffeine, tea, and carbonated beverages. They tend to irritate your bladder.  Empty your bladder often. Avoid holding urine for long periods of time.  Empty your bladder before and after sexual intercourse.  After a bowel movement, women should cleanse from front to back. Use each tissue only once.  SEEK MEDICAL CARE IF:  You have back pain.  You develop a fever.  Your symptoms do not begin to resolve within 3 days.  SEEK IMMEDIATE MEDICAL CARE IF:  You have severe back pain or lower abdominal pain.  You develop chills.  You have nausea or vomiting.  You have continued burning or discomfort with urination.      Diagnosis: Thoracic aortic aneurysm  Assessment and Plan of Treatment: Stable  Smoking cessation  Blood pressure control  Follow-up with Cardio-thoracic surgery     PRINCIPAL DISCHARGE DIAGNOSIS  Diagnosis: COVID-19  Assessment and Plan of Treatment: Initially hypoxic to 92% improved to 95% on RA  otherwise asymptomatic  - given chronic medical conditions, high d-dimer, and risk for progression, will treat with remdesivir while inpatient for up to 5 day course which she completed  Completed Remdesivir  - d-dimer elevation in 2000s - full a/c for DVT as well  - CTA chest negative for PE, shows GGO consistent with known COVID infection.  Supportive care  Quarantine as directed  Follow-up with your Primary Care Doctor      SECONDARY DISCHARGE DIAGNOSES  Diagnosis: UTI (urinary tract infection)  Assessment and Plan of Treatment: Sepsis secondary to acute UTI present on admission, now resolved.  - completed ceftriaxone 1g q24h x 3 days on 1/22  - urine cx growing >100K E. 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 Normal left ventricular systolic function. No segmental  wall motion abnormalities. Normal right ventricular size and function.  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    Diagnosis: Hypercalcemia  Assessment and Plan of Treatment: Hypercalcemia - asymptomatic and Ca<12  - improved s/p IV hydration  - iPTH elevated, with low vit D 25-OH. suspect primary hyperparathyroidism with concomitant low vit D  - SPEP, immunofixation wnl    Diagnosis: Cerebrovascular accident (CVA)  Assessment and Plan of Treatment: Left residual arm hemiparesis  Continue ASA 81mg daily, atorvastatin 40mg qHS.    Diagnosis: ALDA (acute kidney injury)  Assessment and Plan of Treatment: ALDA (acute kidney injury) - prerenal, improved s/p IVF  Resolved    Diagnosis: Type 2 diabetes mellitus  Assessment and Plan of Treatment: HbA1c 9.2%. poorly uncontrolled.  - will need to discharge home on oral regimen likely metformin.     PRINCIPAL DISCHARGE DIAGNOSIS  Diagnosis: COVID-19  Assessment and Plan of Treatment: Initially hypoxic to 92% improved to 95% on RA  otherwise asymptomatic  - given chronic medical conditions, high d-dimer, and risk for progression, will treat with remdesivir while inpatient for up to 5 day course which she completed  Completed Remdesivir  - d-dimer elevation in 2000s - full a/c for DVT as well  - CTA chest negative for PE, shows GGO consistent with known COVID infection.  Supportive care  Quarantine as directed  Follow-up with your Primary Care Doctor      SECONDARY DISCHARGE DIAGNOSES  Diagnosis: UTI (urinary tract infection)  Assessment and Plan of Treatment: Sepsis secondary to acute UTI present on admission, now resolved.  - completed ceftriaxone 1g q24h x 3 days on 1/22  - urine cx growing >100K E. 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 Normal left ventricular systolic function. No segmental  wall motion abnormalities. Normal right ventricular size and function.  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    Diagnosis: DVT, lower extremity  Assessment and Plan of Treatment: - LLE Duplex showing acute L distal femoral and popliteal veins DVT  Continue Eliquis  Walking is encouraged, increase activity as tolerated.  If you develop new leg pain, swelling, and/or redness contact your healthcare provider.  If you develop new chest pain with difficulty breathing, a rapid heart rate and/or a feeling of passing out call emergency medical services 911.      Diagnosis: Hypercalcemia  Assessment and Plan of Treatment: Hypercalcemia - asymptomatic and Ca<12  - improved s/p IV hydration  - iPTH elevated, with low vit D 25-OH. suspect primary hyperparathyroidism with concomitant low vit D  - SPEP, immunofixation wnl    Diagnosis: Cerebrovascular accident (CVA)  Assessment and Plan of Treatment: Left residual arm hemiparesis  Continue ASA 81mg daily, atorvastatin 40mg qHS.    Diagnosis: ALDA (acute kidney injury)  Assessment and Plan of Treatment: ALDA (acute kidney injury) - prerenal, improved s/p IVF  Resolved    Diagnosis: Type 2 diabetes mellitus  Assessment and Plan of Treatment: HbA1c 9.2%. poorly uncontrolled.  - will need to discharge home on oral regimen likely metformin.    Diagnosis: Eschar  Assessment and Plan of Treatment: - left 2nd toe ischemic changes with eschar distally  - rec betadine and leaving open to air daily  Follow up with podiatry

## 2022-01-27 NOTE — DISCHARGE NOTE PROVIDER - NSDCMRMEDTOKEN_GEN_ALL_CORE_FT
acetaminophen 325 mg oral tablet: 2 tab(s) orally every 6 hours, As needed, Temp greater or equal to 38C (100.4F), Mild Pain (1 - 3)  acetaminophen 650 mg rectal suppository: 1 suppository(ies) rectal every 4 hours, As needed, Temp greater or equal to 38.5C (101.3F)  albuterol 90 mcg/inh inhalation aerosol: 2 puff(s) inhaled every 4 hours, As needed, Shortness of Breath and/or Wheezing  aluminum hydroxide-magnesium hydroxide 200 mg-200 mg/5 mL oral suspension: 30 milliliter(s) orally every 4 hours, As needed, Dyspepsia  amLODIPine 10 mg oral tablet: 1 tab(s) orally once a day  ammonium lactate 12% topical lotion: 1 application topically 2 times a day  apixaban 5 mg oral tablet: 2 tab(s) orally every 12 hours  apixaban 5 mg oral tablet: 1 tab(s) orally every 12 hours  aspirin 81 mg oral tablet, chewable: 1 tab(s) orally once a day  atorvastatin 40 mg oral tablet: 1 tab(s) orally once a day (at bedtime)  benzonatate 100 mg oral capsule: 1 cap(s) orally 3 times a day, As needed, Cough  guaifenesin-dextromethorphan 100 mg-10 mg/5 mL oral liquid: 10 milliliter(s) orally every 4 hours, As needed, Cough  labetalol 100 mg oral tablet: 1 tab(s) orally every 12 hours  losartan 100 mg oral tablet: 1 tab(s) orally once a day  melatonin 3 mg oral tablet: 1 tab(s) orally once a day (at bedtime), As needed, Insomnia  nicotine 21 mg/24 hr transdermal film, extended release: 1 patch transdermal once a day  polyethylene glycol 3350 oral powder for reconstitution: 17 gram(s) orally once a day  potassium chloride 20 mEq oral tablet, extended release: 2 tab(s) orally once  senna oral tablet: 2 tab(s) orally once a day (at bedtime)   acetaminophen 325 mg oral tablet: 2 tab(s) orally every 6 hours, As needed, Temp greater or equal to 38C (100.4F), Mild Pain (1 - 3)  acetaminophen 650 mg rectal suppository: 1 suppository(ies) rectal every 4 hours, As needed, Temp greater or equal to 38.5C (101.3F)  albuterol 90 mcg/inh inhalation aerosol: 2 puff(s) inhaled every 4 hours, As needed, Shortness of Breath and/or Wheezing  aluminum hydroxide-magnesium hydroxide 200 mg-200 mg/5 mL oral suspension: 30 milliliter(s) orally every 4 hours, As needed, Dyspepsia  amLODIPine 10 mg oral tablet: 1 tab(s) orally once a day  ammonium lactate 12% topical lotion: 1 application topically 2 times a day  apixaban 5 mg oral tablet: 1 tab(s) orally every 12 hours  aspirin 81 mg oral tablet, chewable: 1 tab(s) orally once a day  atorvastatin 40 mg oral tablet: 1 tab(s) orally once a day (at bedtime)  benzonatate 100 mg oral capsule: 1 cap(s) orally 3 times a day, As needed, Cough  guaifenesin-dextromethorphan 100 mg-10 mg/5 mL oral liquid: 10 milliliter(s) orally every 4 hours, As needed, Cough  labetalol 100 mg oral tablet: 1 tab(s) orally every 12 hours  losartan 100 mg oral tablet: 1 tab(s) orally once a day  melatonin 3 mg oral tablet: 1 tab(s) orally once a day (at bedtime), As needed, Insomnia  nicotine 21 mg/24 hr transdermal film, extended release: 1 patch transdermal once a day  polyethylene glycol 3350 oral powder for reconstitution: 17 gram(s) orally once a day  senna oral tablet: 2 tab(s) orally once a day (at bedtime)   albuterol 90 mcg/inh inhalation aerosol: 2 puff(s) inhaled every 4 hours, As needed, Shortness of Breath and/or Wheezing  aluminum hydroxide-magnesium hydroxide 200 mg-200 mg/5 mL oral suspension: 30 milliliter(s) orally every 4 hours, As needed, Dyspepsia  amLODIPine 10 mg oral tablet: 1 tab(s) orally once a day  ammonium lactate 12% topical lotion: 1 application topically 2 times a day  apixaban 5 mg oral tablet: 1 tab(s) orally every 12 hours  aspirin 81 mg oral tablet, chewable: 1 tab(s) orally once a day  atorvastatin 40 mg oral tablet: 1 tab(s) orally once a day (at bedtime)  benzonatate 100 mg oral capsule: 1 cap(s) orally 3 times a day, As needed, Cough  guaifenesin-dextromethorphan 100 mg-10 mg/5 mL oral liquid: 10 milliliter(s) orally every 4 hours, As needed, Cough  labetalol 100 mg oral tablet: 1 tab(s) orally every 12 hours  losartan 100 mg oral tablet: 1 tab(s) orally once a day  melatonin 3 mg oral tablet: 1 tab(s) orally once a day (at bedtime), As needed, Insomnia  nicotine 21 mg/24 hr transdermal film, extended release: 1 patch transdermal once a day  polyethylene glycol 3350 oral powder for reconstitution: 17 gram(s) orally once a day  senna oral tablet: 2 tab(s) orally once a day (at bedtime)   albuterol 90 mcg/inh inhalation aerosol: 2 puff(s) inhaled every 4 hours, As needed, Shortness of Breath and/or Wheezing  aluminum hydroxide-magnesium hydroxide 200 mg-200 mg/5 mL oral suspension: 30 milliliter(s) orally every 4 hours, As needed, Dyspepsia  amLODIPine 10 mg oral tablet: 1 tab(s) orally once a day  ammonium lactate 12% topical lotion: 1 application topically 2 times a day  apixaban 5 mg oral tablet: 1 tab(s) orally every 12 hours  aspirin 81 mg oral tablet, chewable: 1 tab(s) orally once a day  atorvastatin 40 mg oral tablet: 1 tab(s) orally once a day (at bedtime)  benzonatate 100 mg oral capsule: 1 cap(s) orally 3 times a day, As needed, Cough  guaifenesin-dextromethorphan 100 mg-10 mg/5 mL oral liquid: 10 milliliter(s) orally every 4 hours, As needed, Cough  labetalol 100 mg oral tablet: 1 tab(s) orally every 12 hours  losartan 100 mg oral tablet: 1 tab(s) orally once a day  melatonin 3 mg oral tablet: 1 tab(s) orally once a day (at bedtime), As needed, Insomnia  metFORMIN 500 mg oral tablet: 1 tab(s) orally 2 times a day  nicotine 21 mg/24 hr transdermal film, extended release: 1 patch transdermal once a day  polyethylene glycol 3350 oral powder for reconstitution: 17 gram(s) orally once a day  senna oral tablet: 2 tab(s) orally once a day (at bedtime)

## 2022-01-27 NOTE — PROGRESS NOTE ADULT - SUBJECTIVE AND OBJECTIVE BOX
Vascular Cardiology  Progress note  DIRECT SERVICE NUMBER: [phone]            [email or web address] [email or web address]   OFFICE [phone]    CC: transfer from [facility]    INTERVAL HISTORY:  No events overnight. Pt with pain over LLE, denies chest pain, sob, or palpitations.   Doing well with eliquis        Allergies    Allergy Status Unknown    Intolerances    	   MEDICATIONS  (STANDING):  amLODIPine   Tablet 10 milliGRAM(s) Oral daily  apixaban 10 milliGRAM(s) Oral every 12 hours  aspirin  chewable 81 milliGRAM(s) Oral daily  atorvastatin 40 milliGRAM(s) Oral at bedtime  dextrose 40% Gel 15 Gram(s) Oral once  dextrose 5%. 1000 milliLiter(s) (100 mL/Hr) IV Continuous <Continuous>  dextrose 50% Injectable 25 Gram(s) IV Push once  glucagon  Injectable 1 milliGRAM(s) IntraMuscular once  insulin lispro (ADMELOG) corrective regimen sliding scale   SubCutaneous three times a day before meals  labetalol 100 milliGRAM(s) Oral every 12 hours  losartan 100 milliGRAM(s) Oral daily  nicotine - 21 mG/24Hr(s) Patch 1 patch Transdermal daily  potassium chloride    Tablet ER 40 milliEquivalent(s) Oral once        PAST MEDICAL & SURGICAL HISTORY:  Staghorn calculus    HTN (hypertension)    Thoracic aortic aneurysm    Surgical history unknown        FAMILY HISTORY:  No pertinent family history in first degree relatives        SOCIAL HISTORY:  unchanged    REVIEW OF SYSTEMS:  CONSTITUTIONAL: No fever or fatigue  ENT:  No difficulty hearing, tinnitus, vertigo; No sinus or throat pain  NECK: No pain or stiffness  RESPIRATORY:  No dyspnea or cough  CARDIOVASCULAR:  No chest pain or palpitations   GASTROINTESTINAL: No abdominal or epigastric pain. No nausea, vomiting, or hematemesis; No diarrhea or constipation. No melena or hematochezia.  GENITOURINARY: No dysuria, frequency, hematuria, or incontinence  NEUROLOGICAL: No headaches, memory loss, loss of strength, numbness, or tremors  LYMPH Nodes: No enlarged glands  ENDOCRINE: No heat or cold intolerance; No hair loss  MUSCULOSKELETAL: No joint pain or swelling; No muscle, back, or extremity pain  PSYCHIATRIC: No depression, anxiety, mood swings, or difficulty sleeping  HEME/LYMPH: No easy bruising, or bleeding gums  ALLERGY AND IMMUNOLOGIC: No hives or eczema	    [ x] All others negative	  [ ] Unable to obtain                          13.9   10.16 )-----------( 278      ( 26 Jan 2022 06:36 )             43.2   01-26    141  |  105  |  26<H>  ----------------------------<  147<H>  3.5   |  25  |  0.89    Ca    10.8<H>      26 Jan 2022 06:36          Appearance:  NAD	  HEENT:   Normal oral mucosa  Lymphatic: No lymphadenopathy  Cardiovascular: S1, S2, RRR  Respiratory: CTAB  Psychiatry:  normal affect   Gastrointestinal:  Soft, Non-tender, + BS	  Skin: No rashes, No ecchymoses, No cyanosis	  Neurologic:  non focal  Extremities:  No LE edema     Vascular Pulse Exam:  Right DP: [x]palpable []non-palpable []audible      Left DP :   [ ]palpable [x]non-palpable []audible       Foot Exam:  L 2nd digit eschar                             13.9   10.16 )-----------( 278      ( 26 Jan 2022 06:36 )             43.2   01-26    141  |  105  |  26<H>  ----------------------------<  147<H>  3.5   |  25  |  0.89    Ca    10.8<H>      26 Jan 2022 06:36

## 2022-01-27 NOTE — PROGRESS NOTE ADULT - ASSESSMENT
72 yo F PMH DM2, ?cognitive impairment, CVA w/ residual L arm weakness tx from [facility] with for CTS eval of thoracic aortic aneurysm evaluation now medically managing, found to have UTI, COVID+, elevated d-dimer, hypercalcemia, and acute L distal femoral and popliteal veins and thrombosis of L popliteal artery for which she will follow-up with Vascular Cardiology as outpatient.

## 2022-01-27 NOTE — DISCHARGE NOTE PROVIDER - CARE PROVIDER_API CALL
Valente Burciaga (DO)  Cardiovascular Disease; Internal Medicine; Nuclear Cardiology  [address] [place] [address]  Phone: [phone]  Fax: [phone]  Follow Up Time: 1 week

## 2022-01-28 LAB
GLUCOSE BLDC GLUCOMTR-MCNC: 181 MG/DL — HIGH (ref 70–99)
GLUCOSE BLDC GLUCOMTR-MCNC: 194 MG/DL — HIGH (ref 70–99)
GLUCOSE BLDC GLUCOMTR-MCNC: 209 MG/DL — HIGH (ref 70–99)
GLUCOSE BLDC GLUCOMTR-MCNC: 248 MG/DL — HIGH (ref 70–99)
SARS-COV-2 RNA SPEC QL NAA+PROBE: DETECTED
SARS-COV-2 RNA SPEC QL NAA+PROBE: DETECTED

## 2022-01-28 PROCEDURE — 99232 SBSQ HOSP IP/OBS MODERATE 35: CPT

## 2022-01-28 RX ORDER — POLYETHYLENE GLYCOL 3350 17 G/17G
17 POWDER, FOR SOLUTION ORAL DAILY
Refills: 0 | Status: DISCONTINUED | OUTPATIENT
Start: 2022-01-28 | End: 2022-02-02

## 2022-01-28 RX ORDER — LOSARTAN POTASSIUM 100 MG/1
100 TABLET, FILM COATED ORAL DAILY
Refills: 0 | Status: DISCONTINUED | OUTPATIENT
Start: 2022-01-28 | End: 2022-02-02

## 2022-01-28 RX ORDER — SIMETHICONE 80 MG/1
80 TABLET, CHEWABLE ORAL ONCE
Refills: 0 | Status: COMPLETED | OUTPATIENT
Start: 2022-01-28 | End: 2022-01-28

## 2022-01-28 RX ORDER — SENNA PLUS 8.6 MG/1
2 TABLET ORAL AT BEDTIME
Refills: 0 | Status: DISCONTINUED | OUTPATIENT
Start: 2022-01-28 | End: 2022-02-02

## 2022-01-28 RX ADMIN — AMLODIPINE BESYLATE 10 MILLIGRAM(S): 2.5 TABLET ORAL at 05:30

## 2022-01-28 RX ADMIN — SIMETHICONE 80 MILLIGRAM(S): 80 TABLET, CHEWABLE ORAL at 18:39

## 2022-01-28 RX ADMIN — Medication 1 PATCH: at 07:13

## 2022-01-28 RX ADMIN — APIXABAN 10 MILLIGRAM(S): 2.5 TABLET, FILM COATED ORAL at 05:30

## 2022-01-28 RX ADMIN — LOSARTAN POTASSIUM 100 MILLIGRAM(S): 100 TABLET, FILM COATED ORAL at 15:58

## 2022-01-28 RX ADMIN — Medication 1 PATCH: at 05:27

## 2022-01-28 RX ADMIN — Medication 1 PATCH: at 09:00

## 2022-01-28 RX ADMIN — Medication 81 MILLIGRAM(S): at 11:18

## 2022-01-28 RX ADMIN — Medication 100 MILLIGRAM(S): at 05:31

## 2022-01-28 NOTE — PROGRESS NOTE ADULT - NSPROGADDITIONALINFOA_GEN_ALL_CORE
.  Alice Daniels MD  Division of Hospital Medicine  [facility]   Pager: [phone]    DCP to TIS[facility]. COVID-19 PCR sent today. will need auth.    Plan discussed with patient via , kandy Mcgarry via phone, and medicine NP Amber.

## 2022-01-28 NOTE — PROGRESS NOTE ADULT - SUBJECTIVE AND OBJECTIVE BOX
Vascular Cardiology  Progress note  DIRECT SERVICE NUMBER: [phone]            EMAIL [email or web address]   OFFICE [phone]    CC: transfer from [facility]    INTERVAL HISTORY:  No events overnight. Pt with pain over LLE, denies chest pain, sob, or palpitations.   Doing well with eliquis        Allergies    Allergy Status Unknown    Intolerances    	MEDICATIONS  (STANDING):  amLODIPine   Tablet 10 milliGRAM(s) Oral daily  apixaban 10 milliGRAM(s) Oral every 12 hours  aspirin  chewable 81 milliGRAM(s) Oral daily  atorvastatin 40 milliGRAM(s) Oral at bedtime  dextrose 40% Gel 15 Gram(s) Oral once  dextrose 5%. 1000 milliLiter(s) (100 mL/Hr) IV Continuous <Continuous>  dextrose 50% Injectable 25 Gram(s) IV Push once  glucagon  Injectable 1 milliGRAM(s) IntraMuscular once  insulin lispro (ADMELOG) corrective regimen sliding scale   SubCutaneous three times a day before meals  labetalol 100 milliGRAM(s) Oral every 12 hours  losartan 100 milliGRAM(s) Oral daily  nicotine - 21 mG/24Hr(s) Patch 1 patch Transdermal daily  potassium chloride    Tablet ER 40 milliEquivalent(s) Oral once      PAST MEDICAL & SURGICAL HISTORY:  Staghorn calculus    HTN (hypertension)    Thoracic aortic aneurysm    Surgical history unknown        FAMILY HISTORY:  No pertinent family history in first degree relatives        SOCIAL HISTORY:  unchanged    REVIEW OF SYSTEMS:  CONSTITUTIONAL: No fever or fatigue  ENT:  No difficulty hearing, tinnitus, vertigo; No sinus or throat pain  NECK: No pain or stiffness  RESPIRATORY:  No dyspnea or cough  CARDIOVASCULAR:  No chest pain or palpitations   GASTROINTESTINAL: No abdominal or epigastric pain. No nausea, vomiting, or hematemesis; No diarrhea or constipation. No melena or hematochezia.  GENITOURINARY: No dysuria, frequency, hematuria, or incontinence  NEUROLOGICAL: No headaches, memory loss, loss of strength, numbness, or tremors  LYMPH Nodes: No enlarged glands  ENDOCRINE: No heat or cold intolerance; No hair loss  MUSCULOSKELETAL: No joint pain or swelling; No muscle, back, or extremity pain  PSYCHIATRIC: No depression, anxiety, mood swings, or difficulty sleeping  HEME/LYMPH: No easy bruising, or bleeding gums  ALLERGY AND IMMUNOLOGIC: No hives or eczema	    [ x] All others negative	  [ ] Unable to obtain              ICU Vital Signs Last 24 Hrs  T(C): 36.8 (28 Jan 2022 14:07), Max: 36.9 (27 Jan 2022 20:30)  T(F): 98.2 (28 Jan 2022 14:07), Max: 98.5 (27 Jan 2022 20:30)  HR: 62 (28 Jan 2022 15:50) (58 - 68)  BP: 166/77 (28 Jan 2022 15:50) (135/72 - 167/81)  BP(mean): --  ABP: --  ABP(mean): --  RR: 18 (28 Jan 2022 10:59) (18 - 18)  SpO2: 97% (28 Jan 2022 10:59) (96% - 97%)      Appearance:  NAD	  HEENT:   Normal oral mucosa  Lymphatic: No lymphadenopathy  Cardiovascular: S1, S2, RRR  Respiratory: CTAB  Psychiatry:  normal affect   Gastrointestinal:  Soft, Non-tender, + BS	  Skin: No rashes, No ecchymoses, No cyanosis	  Neurologic:  non focal  Extremities:  No LE edema     Vascular Pulse Exam:  Right DP: [x]palpable []non-palpable []audible      Left DP :   [ ]palpable [x]non-palpable []audible       Foot Exam:  L 2nd digit eschar

## 2022-01-28 NOTE — PROGRESS NOTE ADULT - ASSESSMENT
72 yo F PMH DM2, ?cognitive impairment, CVA w/ residual L arm weakness tx from [facility] with for CTS eval of thoracic aortic aneurysm evaluation now medically managing, found to have UTI, COVID+, elevated d-dimer, hypercalcemia, and acute L distal femoral and popliteal veins and thrombosis of L popliteal artery for which she will follow-up with Vascular Cardiology as outpatient. Awaiting Rehab placement.

## 2022-01-28 NOTE — PROGRESS NOTE ADULT - SUBJECTIVE AND OBJECTIVE BOX
[facility] Division of Hospital Medicine  Alice Daniels MD  Pager (M-F, 0R-9W): [phone]  Other Times:  [phone]      Patient is a 71y old  Female who presents with a chief complaint of AMS, r/o dissection (27 Jan 2022 15:25)      SUBJECTIVE / OVERNIGHT EVENTS:  used. had been refusing COVID PCR. after 10 min discussion finally complied. other than food (as usual) offers no complaints. LLE pain controlled. no headache, dizziness.   ADDITIONAL REVIEW OF SYSTEMS:    MEDICATIONS  (STANDING):  amLODIPine   Tablet 10 milliGRAM(s) Oral daily  apixaban 10 milliGRAM(s) Oral every 12 hours  aspirin  chewable 81 milliGRAM(s) Oral daily  atorvastatin 40 milliGRAM(s) Oral at bedtime  dextrose 40% Gel 15 Gram(s) Oral once  dextrose 5%. 1000 milliLiter(s) (100 mL/Hr) IV Continuous <Continuous>  dextrose 50% Injectable 25 Gram(s) IV Push once  glucagon  Injectable 1 milliGRAM(s) IntraMuscular once  insulin lispro (ADMELOG) corrective regimen sliding scale   SubCutaneous three times a day before meals  labetalol 100 milliGRAM(s) Oral every 12 hours  losartan 100 milliGRAM(s) Oral daily  nicotine - 21 mG/24Hr(s) Patch 1 patch Transdermal daily  potassium chloride    Tablet ER 40 milliEquivalent(s) Oral once    MEDICATIONS  (PRN):  acetaminophen     Tablet .. 650 milliGRAM(s) Oral every 6 hours PRN Temp greater or equal to 38C (100.4F), Mild Pain (1 - 3)  acetaminophen  Suppository .. 650 milliGRAM(s) Rectal every 4 hours PRN Temp greater or equal to 38.5C (101.3F)  ALBUTerol    90 MICROgram(s) HFA Inhaler 2 Puff(s) Inhalation every 4 hours PRN Shortness of Breath and/or Wheezing  aluminum hydroxide/magnesium hydroxide/simethicone Suspension 30 milliLiter(s) Oral every 4 hours PRN Dyspepsia  benzonatate 100 milliGRAM(s) Oral three times a day PRN Cough  guaifenesin/dextromethorphan Oral Liquid 10 milliLiter(s) Oral every 4 hours PRN Cough  melatonin 3 milliGRAM(s) Oral at bedtime PRN Insomnia  ondansetron Injectable 4 milliGRAM(s) IV Push every 8 hours PRN Nausea and/or Vomiting      CAPILLARY BLOOD GLUCOSE      POCT Blood Glucose.: 181 mg/dL (28 Jan 2022 11:45)  POCT Blood Glucose.: 194 mg/dL (28 Jan 2022 07:40)  POCT Blood Glucose.: 205 mg/dL (27 Jan 2022 21:25)  POCT Blood Glucose.: 225 mg/dL (27 Jan 2022 16:25)    I&O's Summary    27 Jan 2022 07:01  -  28 Jan 2022 07:00  --------------------------------------------------------  IN: 1620 mL / OUT: 600 mL / NET: 1020 mL    28 Jan 2022 07:01  -  28 Jan 2022 13:38  --------------------------------------------------------  IN: 840 mL / OUT: 0 mL / NET: 840 mL        PHYSICAL EXAM:  Vital Signs Last 24 Hrs  T(C): 36.8 (28 Jan 2022 10:59), Max: 36.9 (27 Jan 2022 20:30)  T(F): 98.3 (28 Jan 2022 10:59), Max: 98.5 (27 Jan 2022 20:30)  HR: 59 (28 Jan 2022 10:59) (58 - 68)  BP: 156/69 (28 Jan 2022 10:59) (145/76 - 167/81)  BP(mean): --  RR: 18 (28 Jan 2022 10:59) (18 - 18)  SpO2: 97% (28 Jan 2022 10:59) (96% - 97%)    CONSTITUTIONAL: NAD, well-developed  EYES: PERRLA; conjunctiva and sclera clear  ENMT: Moist oral mucosa, no pharyngeal injection or exudates; normal dentition  NECK: Supple, no palpable masses; no thyromegaly  RESPIRATORY: Normal respiratory effort; lungs are clear to auscultation bilaterally  CARDIOVASCULAR: Regular rate and rhythm, normal S1 and S2, no murmur/rub/gallop; No lower extremity edema; Peripheral pulses are 2+ bilaterally  ABDOMEN: Soft, Nondistended, Nontender to palpation, normoactive bowel sounds  MUSCULOSKELETAL:  No clubbing or cyanosis of digits; no joint swelling or tenderness to palpation  PSYCH: A+O to person, place, and time; affect appropriate  NEUROLOGY: no gross sensory deficits, +left sided weakness LUE>LLE (old CVA)  SKIN: No rashes; no palpable lesions, +overgrown toenails    LABS:    CAPILLARY BLOOD GLUCOSE      POCT Blood Glucose.: 181 mg/dL (28 Jan 2022 11:45)  POCT Blood Glucose.: 194 mg/dL (28 Jan 2022 07:40)  POCT Blood Glucose.: 205 mg/dL (27 Jan 2022 21:25)  POCT Blood Glucose.: 225 mg/dL (27 Jan 2022 16:25)            RADIOLOGY & ADDITIONAL TESTS:  Results Reviewed:   Imaging Personally Reviewed:  Electrocardiogram Personally Reviewed:    COORDINATION OF CARE:  Care Discussed with Consultants/Other Providers [Y/N]:  Prior or Outpatient Records Reviewed [Y/N]:

## 2022-01-28 NOTE — PROGRESS NOTE ADULT - PROBLEM SELECTOR PLAN 1
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

## 2022-01-28 NOTE — PROGRESS NOTE ADULT - PROBLEM SELECTOR PLAN 3
asymptomatic and Ca<12  - improved s/p IV hydration  - iPTH elevated, with low vit D 25-OH. suspect primary hyperparathyroidism with concomitant low vit D  - f/u 24 hr urinary calcium - collected, pending results  - can check parathyroid US as outpatient - nephew aware  - SPEP, immunofixation wnl  - f/u UPEP - being collected

## 2022-01-28 NOTE — PROGRESS NOTE ADULT - ASSESSMENT
1. LFV, L popliteal vein DVT  2. Thrombus distal to L SFA and L popliteal arteries   3. Ascending aortic aneurym 5cm  4. 3.4cm infrarenal aortic aneurysm   5. COVID     Plan:    1. Continue with eliquis as planned  2.  Podiatry for local nail care  3.  Medical management of PAD for now, she can see us in the office for management of PAD of the left leg  4.  No plans for any intervention at this moment  5.  Please offer lachydrin 12% to the feet/legs    Patrica [phone]

## 2022-01-29 LAB
CALCIUM 24H UR-MRATE: 80 MG/24 H — SIGNIFICANT CHANGE UP (ref 50–150)
COLLECT DURATION TIME UR: 24 HR — SIGNIFICANT CHANGE UP
GLUCOSE BLDC GLUCOMTR-MCNC: 168 MG/DL — HIGH (ref 70–99)
GLUCOSE BLDC GLUCOMTR-MCNC: 169 MG/DL — HIGH (ref 70–99)
GLUCOSE BLDC GLUCOMTR-MCNC: 216 MG/DL — HIGH (ref 70–99)
TOTAL VOLUME - 24 HOUR: 500 ML — SIGNIFICANT CHANGE UP
URINE CREATININE CALCULATION: 0.6 G/24 H — LOW (ref 0.8–1.8)

## 2022-01-29 PROCEDURE — 99233 SBSQ HOSP IP/OBS HIGH 50: CPT

## 2022-01-29 PROCEDURE — 99232 SBSQ HOSP IP/OBS MODERATE 35: CPT

## 2022-01-29 RX ORDER — HYDRALAZINE HCL 50 MG
25 TABLET ORAL EVERY 8 HOURS
Refills: 0 | Status: DISCONTINUED | OUTPATIENT
Start: 2022-01-29 | End: 2022-01-29

## 2022-01-29 RX ORDER — SOD,AMMONIUM,POTASSIUM LACTATE
1 CREAM (GRAM) TOPICAL
Refills: 0 | Status: DISCONTINUED | OUTPATIENT
Start: 2022-01-29 | End: 2022-02-02

## 2022-01-29 RX ADMIN — ATORVASTATIN CALCIUM 40 MILLIGRAM(S): 80 TABLET, FILM COATED ORAL at 21:58

## 2022-01-29 RX ADMIN — LOSARTAN POTASSIUM 100 MILLIGRAM(S): 100 TABLET, FILM COATED ORAL at 08:48

## 2022-01-29 RX ADMIN — APIXABAN 10 MILLIGRAM(S): 2.5 TABLET, FILM COATED ORAL at 17:44

## 2022-01-29 RX ADMIN — Medication 1 APPLICATION(S): at 17:47

## 2022-01-29 RX ADMIN — APIXABAN 10 MILLIGRAM(S): 2.5 TABLET, FILM COATED ORAL at 08:48

## 2022-01-29 RX ADMIN — AMLODIPINE BESYLATE 10 MILLIGRAM(S): 2.5 TABLET ORAL at 15:42

## 2022-01-29 RX ADMIN — Medication 100 MILLIGRAM(S): at 17:44

## 2022-01-29 RX ADMIN — Medication 81 MILLIGRAM(S): at 15:44

## 2022-01-29 RX ADMIN — Medication 100 MILLIGRAM(S): at 08:48

## 2022-01-29 NOTE — PROGRESS NOTE ADULT - ASSESSMENT
· Assessment	  1. LFV, L popliteal vein DVT  2. Thrombus distal to L SFA and L popliteal arteries   3. Ascending aortic aneurym 5cm  4. 3.4cm infrarenal aortic aneurysm   5. COVID     Plan:    1.  Continue with eliquis as planned  2.  Podiatry for local nail care  3.  Medical management of PAD for now, she can see us in the office for management of PAD of the left leg  4.  No plans for any intervention at this moment  5.  Lachydrin 12% to the feet/legs  6.  Encouraged patient to take her meds - her BP is not well controlled due to non-complaince w meds      [facility], [place] [phone]

## 2022-01-29 NOTE — PROGRESS NOTE ADULT - PROBLEM SELECTOR PLAN 1
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 BP uncontrolled. patient refusing some of her antihypertensive and BP checks     - c/w labetalol 100mg BID (hold for bradycardia)  - c/w amlodipine 10mg daily  - c/w losartan to 100mg daily  - consider adding additional antihypertensive if cardiology in agreement and patient willing to take  - TTE unremarkable  - Partially thrombosed infrarenal AAA 3.4 cm - outpt follow up  - advised smoking cessation

## 2022-01-29 NOTE — PROGRESS NOTE ADULT - ASSESSMENT
70 yo F PMH DM2, ?cognitive impairment, CVA w/ residual L arm weakness tx from [facility] with for CTS eval of thoracic aortic aneurysm evaluation now medically managing, found to have UTI, COVID+, elevated d-dimer, hypercalcemia, and acute L distal femoral and popliteal veins and thrombosis of L popliteal artery for which she will follow-up with Vascular Cardiology as outpatient. Awaiting Rehab placement.

## 2022-01-29 NOTE — PROVIDER CONTACT NOTE (OTHER) - SITUATION
Pt c/o left leg pain
Manual HR 55, automatic HR 56
Patient refusing all care, refusing BP checks, refusing glucose checks and refusing PO and injectable meds.

## 2022-01-29 NOTE — PROVIDER CONTACT NOTE (OTHER) - BACKGROUND
Pt admitted for COVID, AAA. PMH: HTN and staghorn calculus.
Pt admitted for COVID and thoracic aortic aneurysm. PMH: HTN and staghorn calculus.
Patient admitted from [facility] for AAA. No surgical intervention at this time. Lower LE DVT w/ leg pain, on Eliquis but refusing Eliquis at times. covid +.

## 2022-01-29 NOTE — PROGRESS NOTE ADULT - SUBJECTIVE AND OBJECTIVE BOX
Vascular Cardiology  Progress note  DIRECT SERVICE NUMBER: [phone]            [email or web address]   OFFICE [phone]    CC: transfer from [facility]    INTERVAL HISTORY:  No events overnight. Pt with pain over LLE, denies chest pain, sob, or palpitations.   Not taking her meds      Allergies    Allergy Status Unknown    Intolerances   MEDICATIONS  (STANDING):  amLODIPine   Tablet 10 milliGRAM(s) Oral daily  ammonium lactate 12% Lotion 1 Application(s) Topical two times a day  apixaban 10 milliGRAM(s) Oral every 12 hours  aspirin  chewable 81 milliGRAM(s) Oral daily  atorvastatin 40 milliGRAM(s) Oral at bedtime  dextrose 40% Gel 15 Gram(s) Oral once  dextrose 5%. 1000 milliLiter(s) (100 mL/Hr) IV Continuous <Continuous>  dextrose 50% Injectable 25 Gram(s) IV Push once  glucagon  Injectable 1 milliGRAM(s) IntraMuscular once  insulin lispro (ADMELOG) corrective regimen sliding scale   SubCutaneous three times a day before meals  labetalol 100 milliGRAM(s) Oral every 12 hours  losartan 100 milliGRAM(s) Oral daily  nicotine - 21 mG/24Hr(s) Patch 1 patch Transdermal daily  polyethylene glycol 3350 17 Gram(s) Oral daily  potassium chloride    Tablet ER 40 milliEquivalent(s) Oral once  senna 2 Tablet(s) Oral at bedtime    PAST MEDICAL & SURGICAL HISTORY:  Staghorn calculus    HTN (hypertension)    Thoracic aortic aneurysm    Surgical history unknown        FAMILY HISTORY:  No pertinent family history in first degree relatives        SOCIAL HISTORY:  unchanged    REVIEW OF SYSTEMS:  CONSTITUTIONAL: No fever or fatigue  ENT:  No difficulty hearing, tinnitus, vertigo; No sinus or throat pain  NECK: No pain or stiffness  RESPIRATORY:  No dyspnea or cough  CARDIOVASCULAR:  No chest pain or palpitations   GASTROINTESTINAL: No abdominal or epigastric pain. No nausea, vomiting, or hematemesis; No diarrhea or constipation. No melena or hematochezia.  GENITOURINARY: No dysuria, frequency, hematuria, or incontinence  NEUROLOGICAL: No headaches, memory loss, loss of strength, numbness, or tremors  LYMPH Nodes: No enlarged glands  ENDOCRINE: No heat or cold intolerance; No hair loss  MUSCULOSKELETAL: No joint pain or swelling; No muscle, back, or extremity pain  PSYCHIATRIC: No depression, anxiety, mood swings, or difficulty sleeping  HEME/LYMPH: No easy bruising, or bleeding gums  ALLERGY AND IMMUNOLOGIC: No hives or eczema	    [ x] All others negative	  [ ] Unable to obtain       ICU Vital Signs Last 24 Hrs  T(C): 37.3 (29 Jan 2022 10:35), Max: 37.3 (29 Jan 2022 04:13)  T(F): 99.1 (29 Jan 2022 10:35), Max: 99.1 (29 Jan 2022 04:13)  HR: 61 (29 Jan 2022 10:35) (61 - 78)  BP: 166/74 (29 Jan 2022 10:35) (142/79 - 181/76)  BP(mean): --  ABP: --  ABP(mean): --  RR: 18 (29 Jan 2022 10:35) (18 - 18)  SpO2: 94% (29 Jan 2022 10:35) (94% - 98%)    Appearance:  NAD	  HEENT:   Normal oral mucosa  Lymphatic: No lymphadenopathy  Cardiovascular: S1, S2, RRR  Respiratory: CTAB  Psychiatry:  normal affect   Gastrointestinal:  Soft, Non-tender, + BS	  Skin: No rashes, No ecchymoses, No cyanosis	  Neurologic:  non focal  Extremities:  No LE edema     Vascular Pulse Exam:  Right DP: [x]palpable []non-palpable []audible      Left DP :   [ ]palpable [x]non-palpable []audible       Foot Exam:  L 2nd digit eschar

## 2022-01-29 NOTE — PROGRESS NOTE ADULT - PROBLEM SELECTOR PLAN 3
asymptomatic and Ca<12  - improved s/p IV hydration  - iPTH elevated, with low vit D 25-OH. suspect primary hyperparathyroidism with concomitant low vit D  - 24 hr urinary calcium wnl  - can check parathyroid US as outpatient - nephew aware  - SPEP, immunofixation wnl  - f/u UPEP

## 2022-01-29 NOTE — PROGRESS NOTE ADULT - SUBJECTIVE AND OBJECTIVE BOX
[facility] Division of Hospital Medicine  Nasim Carolina MD  Pager (M-F, 8A-5P): [phone]  Other Times:  [phone]    Patient is a 71y old  Female who presents with a chief complaint of AMS, r/o dissection (28 Jan 2022 17:57)      SUBJECTIVE / OVERNIGHT EVENTS:    Patient was examined this morning. She is reporting left foot pain but declining foot/LE exam. She does not want to be examined and has been refusing her meds and vital sign checks as she "doesn't want to be bothered".    ADDITIONAL REVIEW OF SYSTEMS:    MEDICATIONS  (STANDING):  amLODIPine   Tablet 10 milliGRAM(s) Oral daily  ammonium lactate 12% Lotion 1 Application(s) Topical two times a day  apixaban 10 milliGRAM(s) Oral every 12 hours  aspirin  chewable 81 milliGRAM(s) Oral daily  atorvastatin 40 milliGRAM(s) Oral at bedtime  dextrose 40% Gel 15 Gram(s) Oral once  dextrose 5%. 1000 milliLiter(s) (100 mL/Hr) IV Continuous <Continuous>  dextrose 50% Injectable 25 Gram(s) IV Push once  glucagon  Injectable 1 milliGRAM(s) IntraMuscular once  insulin lispro (ADMELOG) corrective regimen sliding scale   SubCutaneous three times a day before meals  labetalol 100 milliGRAM(s) Oral every 12 hours  losartan 100 milliGRAM(s) Oral daily  nicotine - 21 mG/24Hr(s) Patch 1 patch Transdermal daily  polyethylene glycol 3350 17 Gram(s) Oral daily  potassium chloride    Tablet ER 40 milliEquivalent(s) Oral once  senna 2 Tablet(s) Oral at bedtime    MEDICATIONS  (PRN):  acetaminophen     Tablet .. 650 milliGRAM(s) Oral every 6 hours PRN Temp greater or equal to 38C (100.4F), Mild Pain (1 - 3)  acetaminophen  Suppository .. 650 milliGRAM(s) Rectal every 4 hours PRN Temp greater or equal to 38.5C (101.3F)  ALBUTerol    90 MICROgram(s) HFA Inhaler 2 Puff(s) Inhalation every 4 hours PRN Shortness of Breath and/or Wheezing  aluminum hydroxide/magnesium hydroxide/simethicone Suspension 30 milliLiter(s) Oral every 4 hours PRN Dyspepsia  benzonatate 100 milliGRAM(s) Oral three times a day PRN Cough  guaifenesin/dextromethorphan Oral Liquid 10 milliLiter(s) Oral every 4 hours PRN Cough  melatonin 3 milliGRAM(s) Oral at bedtime PRN Insomnia  ondansetron Injectable 4 milliGRAM(s) IV Push every 8 hours PRN Nausea and/or Vomiting      CAPILLARY BLOOD GLUCOSE      POCT Blood Glucose.: 169 mg/dL (29 Jan 2022 07:50)  POCT Blood Glucose.: 209 mg/dL (28 Jan 2022 21:17)  POCT Blood Glucose.: 248 mg/dL (28 Jan 2022 16:20)    I&O's Summary    28 Jan 2022 07:01  -  29 Jan 2022 07:00  --------------------------------------------------------  IN: 840 mL / OUT: 1000 mL / NET: -160 mL    29 Jan 2022 07:01  -  29 Jan 2022 15:52  --------------------------------------------------------  IN: 600 mL / OUT: 400 mL / NET: 200 mL        PHYSICAL EXAM:  Vital Signs Last 24 Hrs  T(C): 37.3 (29 Jan 2022 10:35), Max: 37.3 (29 Jan 2022 04:13)  T(F): 99.1 (29 Jan 2022 10:35), Max: 99.1 (29 Jan 2022 04:13)  HR: 61 (29 Jan 2022 10:35) (61 - 78)  BP: 166/74 (29 Jan 2022 10:35) (142/79 - 181/76)  BP(mean): --  RR: 18 (29 Jan 2022 10:35) (18 - 18)  SpO2: 94% (29 Jan 2022 10:35) (94% - 98%)      CONSTITUTIONAL: NAD, well-developed  EYES: PERRLA; conjunctiva and sclera clear  ENMT: Moist oral mucosa, no pharyngeal injection or exudates; normal dentition  NECK: Supple, no palpable masses; no thyromegaly  RESPIRATORY: Normal respiratory effort; lungs are clear to auscultation bilaterally  CARDIOVASCULAR: Regular rate and rhythm, normal S1 and S2, no murmur/rub/gallop; No lower extremity edema; Peripheral pulses are 2+ bilaterally  ABDOMEN: Soft, Nondistended, Nontender to palpation, normoactive bowel sounds  MUSCULOSKELETAL:  No clubbing or cyanosis of digits; no joint swelling or tenderness to palpation  PSYCH: A+O to person, place, and time; affect appropriate  NEUROLOGY: no gross sensory deficits, +left sided weakness LUE>LLE (old CVA)  SKIN: No rashes; no palpable lesions, overgrown nails      LABS:                      RADIOLOGY & ADDITIONAL TESTS:  Results Reviewed:   Imaging Personally Reviewed:  Electrocardiogram Personally Reviewed:    COORDINATION OF CARE:  Care Discussed with Consultants/Other Providers [Y/N]:  Prior or Outpatient Records Reviewed [Y/N]:

## 2022-01-29 NOTE — PROVIDER CONTACT NOTE (MEDICATION) - BACKGROUND
71F transferred from [facility] for "symptomatic AAA." found to have 5cm thoracic aneurysm at aortic arch, also with mild in the aschending/descending thoracic region

## 2022-01-29 NOTE — PROVIDER CONTACT NOTE (MEDICATION) - ASSESSMENT
Pt states she does not need her medications and refuses to take them. vitals are stable no s/s of cp, SOB, or distress.

## 2022-01-29 NOTE — PROVIDER CONTACT NOTE (OTHER) - ASSESSMENT
Assessment and Plan:       1. Health care maintenance  I discussed importance of healthy diet and regular walking.    No new exertional symptoms.  - Full code    2. Hypercholesterolemia  Moderate cardiovascular risk with brother with heart disease and positive cardiac CT scan June 2018 with a cardiac CT scan score 44.  Chest images were otherwise negative.    Has tolerated simvastatin 10 mg so far.  I discussed toxicity risk including muscle and liver toxicity and other risks.  Patient accepts these risks and wishes to continue with statin drug.  I did discuss option of increasing the simvastatin to 20 mg a day or changing to atorvastatin 10 mg a day.  Patient prefers to change to atorvastatin 10 mg a day.  Warnings given to patient to seek medical attention of muscle aches or abdominal pain or other new side effects occur.  I discussed toxicity risk with patient.    He will check liver test before leaving for [place].  Come fasting in the spring when he returns from [place].    Goal LDL less than 130.  - Lipid Cascade    3. History of colon polyps  3 year colonoscopy will be due September 2019    4. Sleep apnea, unspecified type  Compliant with CPAP.    He should reduce or stop alcohol.    5. Medication monitoring encounter    - Comprehensive Metabolic Panel  - HM2(CBC w/o Differential)  - CK Total  - Hepatic Profile; Future    6. Screening for prostate cancer    - PSA (Prostatic-Specific Antigen), Annual Screen    7. Erectile dysfunction, unspecified erectile dysfunction type  Patient was also given a prescription for mail order if needed.  I did warn him on the likely high cost of the prescription.   he could consider 20 mg Revatio tablets if needed  - sildenafil (VIAGRA) 100 MG tablet; Take one half or 1 tablet up to once a day as needed.  Dispense: 20 tablet; Refill: 5    8. Routine general medical examination at a health care facility  Additional time spent with discussion on his erectile dysfunction issue 
Pt VSS, no s/s of DVT, pulses intact and palpable.
and his hypercholesterolemia and medication change.  Numerous prescription for atorvastatin 10 mg a day was sent to his pharmacy.    Past history of basal cell cancer, no evidence of recurrence and will follow up in [place] in November.  Her graph flu shot this fall.    Every other year ophthalmology checkup due this year.    The patient's current medical problems were reviewed.    I have had an Advance Directives discussion with the patient.  The following health maintenance schedule was reviewed with the patient and provided in printed form in the after visit summary:   Health Maintenance   Topic Date Due     INFLUENZA VACCINE RULE BASED (1) 08/01/2018     FALL RISK ASSESSMENT  05/14/2019     COLONOSCOPY  09/14/2019     ADVANCE DIRECTIVES DISCUSSED WITH PATIENT  09/19/2022     TD 18+ HE  06/30/2026     PNEUMOCOCCAL POLYSACCHARIDE VACCINE AGE 65 AND OVER  Completed     PNEUMOCOCCAL CONJUGATE VACCINE FOR ADULTS (PCV13 OR PREVNAR)  Completed     ZOSTER VACCINE  Completed        Subjective:   Chief Complaint: Niranjan Dodge is an 72 y.o. male here for an Annual Wellness visit.   HPI: 72-year-old male here for an adult wellness visit he also wanted to discuss his erectile dysfunction issue with a new prescription for Viagra.    He also wished to discuss new issue of hypercholesterolemia which he initiated treatment with simvastatin on July 5    Patient's brother had an MI recently.  Patient has moderate hypercholesterolemia, but also with a high HDL.  August 2017 HDL 78 and .  May 2018 HDL 71 and .  He was started on simvastatin 20 mg a day by Dr. Patel, but patient cut those tablets in half for only 10 mg a day, because I originally talked to him about starting atorvastatin 10 mg a day.    He did not have any new muscle aches or fatigue, no abdominal pain or identifiable side effects of starting the simvastatin.    He remains active with regular walking and plays golf.  No chest pain or exertional 
Patient is A&O4, German speaking. Refusing care. Patient is not allowing BP checks, medication administration, glucose checks as well as insulin administration. She refusing to allow RN to flush her IV to check for patency. Consistently saying "leave me alone", swing her right arm becoming more uncooperative and agitated. Continuously requesting to go home.
symptoms.    No history of cardiac events.    He takes a low-dose aspirin and omega-3 fatty acid supplement.    No epigastric pain or bleeding.    He does have moderate sleep apnea diagnosed 2070 sleep study with moderate restless leg, compliant with CPAP and he feels he sleeps okay at night denies significant daytime sleepiness.    He has drank 2-3 alcohol drinks in the evening in the past.    His sister  1 week ago, he feels he is adequately morning, denies depression or anxiety.    Past history of colon polyps with a 3 year follow-up plan for 2019.  2016 was an 8 mm rectal tubular adenoma.  Also normal no blood in stool or melena.  No abdominal pain.    He denies any urinary difficulty.    He has moderate erectile dysfunction with some benefit from Viagra.  He had given the 20 mg tablets of the Viagra type medication in the past, but he currently has a prescription from a [facility] pharmacy 400 mg tablets and is requesting those type tablets.  He does state that it works without significant side effects.    No vision changes.  No current headache issues.    No asthma or wheezing or new cough.  He has never smoked.    His blood pressure is always been normal without medication.    Past history of basal cell cancer of the right eye, he sees dermatology in [place] in November.  No new lesions identified.    I did discuss CODE STATUS, patient was to continue full code.    Lives independently.  He has a wife spends most of the summer and the cabin in [facility].  Her graph his diet is been good.  No history of diabetes.    No swallowing difficulty.  No falls.  No arthritis or pain complaints.    Review of Systems:    Please see above.  The rest of the review of systems are negative for all systems.    Patient Care Team:  Herberth Duffy MD as PCP - General     Patient Active Problem List   Diagnosis     External Hemorrhoids     Ptosis Of Eyelid     Sleep Apnea     History of 
"colon polyps     Hypercholesterolemia     No past medical history on file.   No past surgical history on file.   Family History   Problem Relation Age of Onset     Sleep apnea Father      Snoring Father       Social History     Social History     Marital status: Single     Spouse name: N/A     Number of children: N/A     Years of education: N/A     Occupational History     Not on file.     Social History Main Topics     Smoking status: Never Smoker     Smokeless tobacco: Never Used     Alcohol use Not on file     Drug use: Not on file     Sexual activity: Not on file     Other Topics Concern     Not on file     Social History Narrative      Current Outpatient Prescriptions   Medication Sig Dispense Refill     aspirin 81 mg chewable tablet Chew 81 mg daily.       OMEGA-3/DHA/EPA/FISH OIL (FISH OIL-OMEGA-3 FATTY ACIDS) 300-1,000 mg capsule Take 2 g by mouth daily.       sildenafil (VIAGRA) 100 MG tablet Take one half or 1 tablet up to once a day as needed. 20 tablet 5     simvastatin (ZOCOR) 20 MG tablet Take 1 tablet (20 mg total) by mouth every evening. (Patient taking differently: Take 10 mg by mouth every evening. ) 90 tablet 3     No current facility-administered medications for this visit.       Objective:   Vital Signs:   Visit Vitals     /80     Pulse 60     Ht 5' 9\" (1.753 m)     Wt 186 lb (84.4 kg)     SpO2 98%     BMI 27.47 kg/m2        VisionScreening:  No exam data present     PHYSICAL EXAM  Alert and oriented ×3 with good mood and affect.  He had some difficulty with the hands on the clock but otherwise appears appropriate.  He had 1 difficulty with 1 out of 3 words.  Otherwise appropriate cognitive interaction today.  Pupils and irises equal and reactive.  Extraocular muscles intact.  External ears and nose exam is normal.  Moderate cerumen but he denies significant symptoms.  Pharynx is normal, just mildly crowded.  Teeth in good condition.  No oral lesions or leukoplakia.  No cervical or "
Pt /80, manual HR 55, RR 18, temp 36.7C, SpO2 97. No c/o chest pain, palpitations, SOB
supraclavicular or inguinal adenopathy.  No JVD and no carotid bruits.  No thyromegaly or nodularity.  Lungs are clear to auscultation with normal respiratory aspiration.  Heart is regular without murmur rub or gallop.  Is nontender nonobese no hepatosplenomegaly and no pulsatile mass.  No ankle edema and +1 pedal pulses bilaterally.  Feet in good condition.  Prostate is smooth and no nodule.  Within normal size.  Slightly more prominent right lobe of the prostate.  Skin exam normal to inspection and palpation.  Gait is normal.  Testicles normal bilaterally and no inguinal hernia.    Assessment Results 9/4/2018   Activities of Daily Living No help needed   Instrumental Activities of Daily Living No help needed   Get Up and Go Score Less than 12 seconds   Mini Cog Total Score 3   Some recent data might be hidden     A Mini-Cog score of 0-2 suggests the possibility of dementia, score of 3-5 suggests no dementia    Identified Health Risks:     Patient's advanced directive was discussed and I am comfortable with the patient's wishes.

## 2022-01-30 LAB
GLUCOSE BLDC GLUCOMTR-MCNC: 173 MG/DL — HIGH (ref 70–99)
GLUCOSE BLDC GLUCOMTR-MCNC: 178 MG/DL — HIGH (ref 70–99)
GLUCOSE BLDC GLUCOMTR-MCNC: 180 MG/DL — HIGH (ref 70–99)
GLUCOSE BLDC GLUCOMTR-MCNC: 232 MG/DL — HIGH (ref 70–99)

## 2022-01-30 PROCEDURE — 99232 SBSQ HOSP IP/OBS MODERATE 35: CPT

## 2022-01-30 RX ADMIN — Medication 1: at 11:46

## 2022-01-30 RX ADMIN — Medication 650 MILLIGRAM(S): at 01:57

## 2022-01-30 RX ADMIN — Medication 100 MILLIGRAM(S): at 17:24

## 2022-01-30 RX ADMIN — Medication 81 MILLIGRAM(S): at 11:37

## 2022-01-30 RX ADMIN — Medication 2: at 17:21

## 2022-01-30 RX ADMIN — Medication 650 MILLIGRAM(S): at 00:51

## 2022-01-30 RX ADMIN — Medication 1 APPLICATION(S): at 17:24

## 2022-01-30 RX ADMIN — APIXABAN 10 MILLIGRAM(S): 2.5 TABLET, FILM COATED ORAL at 17:24

## 2022-01-30 RX ADMIN — Medication 3 MILLIGRAM(S): at 22:19

## 2022-01-30 RX ADMIN — ATORVASTATIN CALCIUM 40 MILLIGRAM(S): 80 TABLET, FILM COATED ORAL at 22:19

## 2022-01-30 RX ADMIN — POLYETHYLENE GLYCOL 3350 17 GRAM(S): 17 POWDER, FOR SOLUTION ORAL at 11:37

## 2022-01-30 RX ADMIN — Medication 1: at 08:25

## 2022-01-30 NOTE — PROGRESS NOTE ADULT - ASSESSMENT
70 yo F PMH DM2, ?cognitive impairment, CVA w/ residual L arm weakness tx from [facility] with for CTS eval of thoracic aortic aneurysm evaluation now medically managing, found to have UTI, COVID+, elevated d-dimer, hypercalcemia, and acute L distal femoral and popliteal veins and thrombosis of L popliteal artery for which she will follow-up with Vascular Cardiology as outpatient. Awaiting Rehab placement.

## 2022-01-30 NOTE — PROVIDER CONTACT NOTE (OTHER) - REASON
Covid isolation discontinuation
Patient refusing all care, refusing BP checks, refusing glucose checks and refusing PO and injectable meds.
Pt c/o left leg pain
HR 55

## 2022-01-30 NOTE — PROVIDER CONTACT NOTE (OTHER) - ACTION/TREATMENT ORDERED:
NP Zara Rojas & Dr. Ge Rouse made aware. Will continue to attempt to deliver care. Will continue to monitor.
pt isolated >10 days, no longer symptomatic for covid, isolation discontinued.
Provider made aware, reschedule amlodipine, give labetalol, continue to monitor.
Provider made aware, give tylenol 650mg, will order labs, will continue to monitor

## 2022-01-30 NOTE — PROGRESS NOTE ADULT - PROBLEM SELECTOR PLAN 1
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 BP uncontrolled. 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

## 2022-01-30 NOTE — PROGRESS NOTE ADULT - SUBJECTIVE AND OBJECTIVE BOX
[facility] Division of Hospital Medicine  Nasim Carolina MD  Pager (M-F, 8A-5P): [phone]  Other Times:  [phone]    Patient is a 71y old  Female who presents with a chief complaint of AMS, r/o dissection (29 Jan 2022 16:28)      SUBJECTIVE / OVERNIGHT EVENTS:    Patient was examined this morning. She was sleeping comfortably, easily arousable. States she feels dizzy, no other complaint. Rest of 10 point ROS negative.       ADDITIONAL REVIEW OF SYSTEMS:    MEDICATIONS  (STANDING):  amLODIPine   Tablet 10 milliGRAM(s) Oral daily  ammonium lactate 12% Lotion 1 Application(s) Topical two times a day  apixaban 10 milliGRAM(s) Oral every 12 hours  aspirin  chewable 81 milliGRAM(s) Oral daily  atorvastatin 40 milliGRAM(s) Oral at bedtime  dextrose 40% Gel 15 Gram(s) Oral once  dextrose 5%. 1000 milliLiter(s) (100 mL/Hr) IV Continuous <Continuous>  dextrose 50% Injectable 25 Gram(s) IV Push once  glucagon  Injectable 1 milliGRAM(s) IntraMuscular once  insulin lispro (ADMELOG) corrective regimen sliding scale   SubCutaneous three times a day before meals  labetalol 100 milliGRAM(s) Oral every 12 hours  losartan 100 milliGRAM(s) Oral daily  nicotine - 21 mG/24Hr(s) Patch 1 patch Transdermal daily  polyethylene glycol 3350 17 Gram(s) Oral daily  potassium chloride    Tablet ER 40 milliEquivalent(s) Oral once  senna 2 Tablet(s) Oral at bedtime    MEDICATIONS  (PRN):  acetaminophen     Tablet .. 650 milliGRAM(s) Oral every 6 hours PRN Temp greater or equal to 38C (100.4F), Mild Pain (1 - 3)  acetaminophen  Suppository .. 650 milliGRAM(s) Rectal every 4 hours PRN Temp greater or equal to 38.5C (101.3F)  ALBUTerol    90 MICROgram(s) HFA Inhaler 2 Puff(s) Inhalation every 4 hours PRN Shortness of Breath and/or Wheezing  aluminum hydroxide/magnesium hydroxide/simethicone Suspension 30 milliLiter(s) Oral every 4 hours PRN Dyspepsia  benzonatate 100 milliGRAM(s) Oral three times a day PRN Cough  guaifenesin/dextromethorphan Oral Liquid 10 milliLiter(s) Oral every 4 hours PRN Cough  melatonin 3 milliGRAM(s) Oral at bedtime PRN Insomnia  ondansetron Injectable 4 milliGRAM(s) IV Push every 8 hours PRN Nausea and/or Vomiting      CAPILLARY BLOOD GLUCOSE      POCT Blood Glucose.: 178 mg/dL (30 Jan 2022 11:22)  POCT Blood Glucose.: 180 mg/dL (30 Jan 2022 07:46)  POCT Blood Glucose.: 216 mg/dL (29 Jan 2022 21:11)  POCT Blood Glucose.: 168 mg/dL (29 Jan 2022 16:47)    I&O's Summary    29 Jan 2022 07:01  -  30 Jan 2022 07:00  --------------------------------------------------------  IN: 1320 mL / OUT: 1100 mL / NET: 220 mL    30 Jan 2022 07:01  -  30 Jan 2022 15:46  --------------------------------------------------------  IN: 720 mL / OUT: 400 mL / NET: 320 mL        PHYSICAL EXAM:  Vital Signs Last 24 Hrs  T(C): 36.8 (30 Jan 2022 10:47), Max: 37.1 (30 Jan 2022 04:36)  T(F): 98.2 (30 Jan 2022 10:47), Max: 98.7 (30 Jan 2022 04:36)  HR: 61 (30 Jan 2022 10:47) (60 - 71)  BP: 156/75 (30 Jan 2022 10:47) (110/80 - 156/75)  BP(mean): --  RR: 18 (30 Jan 2022 10:47) (18 - 18)  SpO2: 100% (30 Jan 2022 10:47) (96% - 100%)      CONSTITUTIONAL: NAD, well-developed  EYES: PERRLA; conjunctiva and sclera clear  ENMT: Moist oral mucosa, no pharyngeal injection or exudates; normal dentition  NECK: Supple, no palpable masses; no thyromegaly  RESPIRATORY: Normal respiratory effort; lungs are clear to auscultation bilaterally  CARDIOVASCULAR: Regular rate and rhythm, normal S1 and S2, no murmur/rub/gallop; No lower extremity edema; Peripheral pulses are 2+ bilaterally  ABDOMEN: Soft, Nondistended, Nontender to palpation, normoactive bowel sounds  MUSCULOSKELETAL:  No clubbing or cyanosis of digits; no joint swelling or tenderness to palpation  PSYCH: A+O to person, place, and time; affect appropriate  NEUROLOGY: no gross sensory deficits, +left sided weakness LUE>LLE (old CVA)  SKIN: No rashes; no palpable lesions, overgrown nails      LABS:                      RADIOLOGY & ADDITIONAL TESTS:  Results Reviewed:   Imaging Personally Reviewed:  Electrocardiogram Personally Reviewed:    COORDINATION OF CARE:  Care Discussed with Consultants/Other Providers [Y/N]:  Prior or Outpatient Records Reviewed [Y/N]:

## 2022-01-31 LAB
% ALBUMIN: 45 % — SIGNIFICANT CHANGE UP
% ALPHA 1: 4.9 % — SIGNIFICANT CHANGE UP
% ALPHA 2: 12.2 % — SIGNIFICANT CHANGE UP
% BETA: 15 % — SIGNIFICANT CHANGE UP
% GAMMA: 22.9 % — SIGNIFICANT CHANGE UP
ALBUMIN SERPL ELPH-MCNC: 3.2 G/DL — LOW (ref 3.6–5.5)
ALBUMIN/GLOB SERPL ELPH: 0.8 RATIO — SIGNIFICANT CHANGE UP
ALPHA1 GLOB SERPL ELPH-MCNC: 0.4 G/DL — SIGNIFICANT CHANGE UP (ref 0.1–0.4)
ALPHA2 GLOB SERPL ELPH-MCNC: 0.9 G/DL — SIGNIFICANT CHANGE UP (ref 0.5–1)
B-GLOBULIN SERPL ELPH-MCNC: 1.1 G/DL — HIGH (ref 0.5–1)
GAMMA GLOBULIN: 1.6 G/DL — SIGNIFICANT CHANGE UP (ref 0.6–1.6)
GLUCOSE BLDC GLUCOMTR-MCNC: 188 MG/DL — HIGH (ref 70–99)
GLUCOSE BLDC GLUCOMTR-MCNC: 206 MG/DL — HIGH (ref 70–99)
GLUCOSE BLDC GLUCOMTR-MCNC: 223 MG/DL — HIGH (ref 70–99)
GLUCOSE BLDC GLUCOMTR-MCNC: 296 MG/DL — HIGH (ref 70–99)
INTERPRETATION SERPL IFE-IMP: SIGNIFICANT CHANGE UP
PROT PATTERN SERPL ELPH-IMP: SIGNIFICANT CHANGE UP

## 2022-01-31 PROCEDURE — 99231 SBSQ HOSP IP/OBS SF/LOW 25: CPT | Mod: 25

## 2022-01-31 PROCEDURE — 99232 SBSQ HOSP IP/OBS MODERATE 35: CPT

## 2022-01-31 PROCEDURE — 11721 DEBRIDE NAIL 6 OR MORE: CPT

## 2022-01-31 RX ORDER — LANOLIN ALCOHOL/MO/W.PET/CERES
1 CREAM (GRAM) TOPICAL
Qty: 0 | Refills: 0 | DISCHARGE
Start: 2022-01-31

## 2022-01-31 RX ORDER — LOSARTAN POTASSIUM 100 MG/1
1 TABLET, FILM COATED ORAL
Qty: 0 | Refills: 0 | DISCHARGE
Start: 2022-01-31

## 2022-01-31 RX ORDER — POTASSIUM CHLORIDE 20 MEQ
2 PACKET (EA) ORAL
Qty: 0 | Refills: 0 | DISCHARGE
Start: 2022-01-31

## 2022-01-31 RX ORDER — ALBUTEROL 90 UG/1
2 AEROSOL, METERED ORAL
Qty: 0 | Refills: 0 | DISCHARGE
Start: 2022-01-31

## 2022-01-31 RX ORDER — APIXABAN 2.5 MG/1
1 TABLET, FILM COATED ORAL
Qty: 0 | Refills: 0 | DISCHARGE
Start: 2022-01-31

## 2022-01-31 RX ORDER — ASPIRIN/CALCIUM CARB/MAGNESIUM 324 MG
1 TABLET ORAL
Qty: 0 | Refills: 0 | DISCHARGE
Start: 2022-01-31

## 2022-01-31 RX ORDER — SOD,AMMONIUM,POTASSIUM LACTATE
1 CREAM (GRAM) TOPICAL
Qty: 0 | Refills: 0 | DISCHARGE
Start: 2022-01-31

## 2022-01-31 RX ORDER — NICOTINE POLACRILEX 2 MG
1 GUM BUCCAL
Qty: 0 | Refills: 0 | DISCHARGE
Start: 2022-01-31

## 2022-01-31 RX ORDER — LABETALOL HCL 100 MG
1 TABLET ORAL
Qty: 0 | Refills: 0 | DISCHARGE
Start: 2022-01-31

## 2022-01-31 RX ORDER — POLYETHYLENE GLYCOL 3350 17 G/17G
17 POWDER, FOR SOLUTION ORAL
Qty: 0 | Refills: 0 | DISCHARGE
Start: 2022-01-31

## 2022-01-31 RX ORDER — ACETAMINOPHEN 500 MG
2 TABLET ORAL
Qty: 0 | Refills: 0 | DISCHARGE
Start: 2022-01-31

## 2022-01-31 RX ORDER — AMLODIPINE BESYLATE 2.5 MG/1
1 TABLET ORAL
Qty: 0 | Refills: 0 | DISCHARGE
Start: 2022-01-31

## 2022-01-31 RX ORDER — SENNA PLUS 8.6 MG/1
2 TABLET ORAL
Qty: 0 | Refills: 0 | DISCHARGE
Start: 2022-01-31

## 2022-01-31 RX ORDER — ATORVASTATIN CALCIUM 80 MG/1
1 TABLET, FILM COATED ORAL
Qty: 0 | Refills: 0 | DISCHARGE
Start: 2022-01-31

## 2022-01-31 RX ORDER — APIXABAN 2.5 MG/1
2 TABLET, FILM COATED ORAL
Qty: 0 | Refills: 0 | DISCHARGE
Start: 2022-01-31

## 2022-01-31 RX ORDER — GUAIFENESIN/DEXTROMETHORPHAN 600MG-30MG
10 TABLET, EXTENDED RELEASE 12 HR ORAL
Qty: 0 | Refills: 0 | DISCHARGE
Start: 2022-01-31

## 2022-01-31 RX ORDER — ACETAMINOPHEN 500 MG
1 TABLET ORAL
Qty: 0 | Refills: 0 | DISCHARGE
Start: 2022-01-31

## 2022-01-31 RX ADMIN — APIXABAN 10 MILLIGRAM(S): 2.5 TABLET, FILM COATED ORAL at 05:58

## 2022-01-31 RX ADMIN — AMLODIPINE BESYLATE 10 MILLIGRAM(S): 2.5 TABLET ORAL at 05:58

## 2022-01-31 RX ADMIN — Medication 81 MILLIGRAM(S): at 12:41

## 2022-01-31 RX ADMIN — Medication 100 MILLIGRAM(S): at 05:58

## 2022-01-31 RX ADMIN — Medication 3 MILLIGRAM(S): at 21:55

## 2022-01-31 RX ADMIN — Medication 650 MILLIGRAM(S): at 04:20

## 2022-01-31 RX ADMIN — APIXABAN 10 MILLIGRAM(S): 2.5 TABLET, FILM COATED ORAL at 17:49

## 2022-01-31 RX ADMIN — Medication 650 MILLIGRAM(S): at 03:53

## 2022-01-31 RX ADMIN — Medication 2: at 08:50

## 2022-01-31 RX ADMIN — ATORVASTATIN CALCIUM 40 MILLIGRAM(S): 80 TABLET, FILM COATED ORAL at 21:55

## 2022-01-31 RX ADMIN — Medication 1 APPLICATION(S): at 17:49

## 2022-01-31 RX ADMIN — Medication 1 APPLICATION(S): at 05:58

## 2022-01-31 RX ADMIN — Medication 100 MILLIGRAM(S): at 17:49

## 2022-01-31 RX ADMIN — Medication 2: at 12:41

## 2022-01-31 RX ADMIN — LOSARTAN POTASSIUM 100 MILLIGRAM(S): 100 TABLET, FILM COATED ORAL at 05:58

## 2022-01-31 NOTE — PROGRESS NOTE ADULT - SUBJECTIVE AND OBJECTIVE BOX
Patient is a 71y old  Female who presents with a chief complaint of AMS, r/o dissection (30 Jan 2022 15:45)       INTERVAL HPI/OVERNIGHT EVENTS:  Patient seen and evaluated at bedside.  Pt is resting comfortable in NAD.    Allergies    Allergy Status Unknown    Intolerances        Vital Signs Last 24 Hrs  T(C): 36.6 (31 Jan 2022 11:36), Max: 36.8 (31 Jan 2022 03:46)  T(F): 97.9 (31 Jan 2022 11:36), Max: 98.2 (31 Jan 2022 03:46)  HR: 72 (31 Jan 2022 11:36) (68 - 82)  BP: 156/75 (31 Jan 2022 11:36) (130/83 - 162/82)  BP(mean): --  RR: 18 (31 Jan 2022 11:36) (18 - 18)  SpO2: 97% (31 Jan 2022 11:36) (95% - 97%)    LABS:              CAPILLARY BLOOD GLUCOSE      POCT Blood Glucose.: 206 mg/dL (31 Jan 2022 12:12)  POCT Blood Glucose.: 223 mg/dL (31 Jan 2022 08:30)  POCT Blood Glucose.: 173 mg/dL (30 Jan 2022 21:23)  POCT Blood Glucose.: 232 mg/dL (30 Jan 2022 16:33)      Lower Extremity Physical Exam:  Vasular: DP/PT w/4, on R 0/4 on left CFT slightly delayed to left 2nd toe, TG wnl b/l  Neuro: Epicritic sensation wnl to level of foot b/l  Musculoskeletal/Ortho: hammertoes 2-5 bilat with pain on palp of toes  Skin: left foot 2nd toe gangrenous changes to distal plantar aspect of toe with full thickness eschar no signs of infection, toenails thickened and severely elongated bilat

## 2022-01-31 NOTE — PROGRESS NOTE ADULT - ASSESSMENT
72 yo F PMH DM2, ?cognitive impairment, CVA w/ residual L arm weakness tx from [facility] with for CTS eval of thoracic aortic aneurysm evaluation now medically managing, found to have UTI, COVID+, elevated d-dimer, hypercalcemia, and acute L distal femoral and popliteal veins and thrombosis of L popliteal artery for which she will follow-up with Vascular Cardiology as outpatient. Awaiting Rehab placement.

## 2022-01-31 NOTE — PROGRESS NOTE ADULT - SUBJECTIVE AND OBJECTIVE BOX
[facility] Division of Hospital Medicine  Prasad Taylor MD, NAYLA  I'm reachable on [employer] Teams   Off hours:  [phone] ([facility] pager)    Patient is a 71y old  Female who presents with a chief complaint of AMS, r/o dissection (31 Jan 2022 12:54)      SUBJECTIVE / OVERNIGHT EVENTS:  No events overnight.     MEDICATIONS  (STANDING):  amLODIPine   Tablet 10 milliGRAM(s) Oral daily  ammonium lactate 12% Lotion 1 Application(s) Topical two times a day  apixaban 10 milliGRAM(s) Oral every 12 hours  aspirin  chewable 81 milliGRAM(s) Oral daily  atorvastatin 40 milliGRAM(s) Oral at bedtime  glucagon  Injectable 1 milliGRAM(s) IntraMuscular once  insulin lispro (ADMELOG) corrective regimen sliding scale   SubCutaneous three times a day before meals  labetalol 100 milliGRAM(s) Oral every 12 hours  losartan 100 milliGRAM(s) Oral daily  nicotine - 21 mG/24Hr(s) Patch 1 patch Transdermal daily  polyethylene glycol 3350 17 Gram(s) Oral daily  potassium chloride    Tablet ER 40 milliEquivalent(s) Oral once  senna 2 Tablet(s) Oral at bedtime    MEDICATIONS  (PRN):  acetaminophen     Tablet .. 650 milliGRAM(s) Oral every 6 hours PRN Temp greater or equal to 38C (100.4F), Mild Pain (1 - 3)  acetaminophen  Suppository .. 650 milliGRAM(s) Rectal every 4 hours PRN Temp greater or equal to 38.5C (101.3F)  ALBUTerol    90 MICROgram(s) HFA Inhaler 2 Puff(s) Inhalation every 4 hours PRN Shortness of Breath and/or Wheezing  aluminum hydroxide/magnesium hydroxide/simethicone Suspension 30 milliLiter(s) Oral every 4 hours PRN Dyspepsia  benzonatate 100 milliGRAM(s) Oral three times a day PRN Cough  guaifenesin/dextromethorphan Oral Liquid 10 milliLiter(s) Oral every 4 hours PRN Cough  melatonin 3 milliGRAM(s) Oral at bedtime PRN Insomnia  ondansetron Injectable 4 milliGRAM(s) IV Push every 8 hours PRN Nausea and/or Vomiting    CAPILLARY BLOOD GLUCOSE      POCT Blood Glucose.: 188 mg/dL (31 Jan 2022 17:12)  POCT Blood Glucose.: 206 mg/dL (31 Jan 2022 12:12)  POCT Blood Glucose.: 223 mg/dL (31 Jan 2022 08:30)  POCT Blood Glucose.: 173 mg/dL (30 Jan 2022 21:23)    I&O's Summary    30 Jan 2022 07:01  -  31 Jan 2022 07:00  --------------------------------------------------------  IN: 1400 mL / OUT: 720 mL / NET: 680 mL        PHYSICAL EXAM:  Vital Signs Last 24 Hrs  T(C): 36.6 (31 Jan 2022 11:36), Max: 36.8 (31 Jan 2022 03:46)  T(F): 97.9 (31 Jan 2022 11:36), Max: 98.2 (31 Jan 2022 03:46)  HR: 62 (31 Jan 2022 17:50) (62 - 72)  BP: 125/67 (31 Jan 2022 17:50) (125/67 - 162/82)  BP(mean): --  RR: 18 (31 Jan 2022 17:50) (18 - 18)  SpO2: 97% (31 Jan 2022 17:50) (95% - 97%)    CONSTITUTIONAL: NAD, well-developed  EYES: EOMI; conjunctiva and sclera clear  ENMT: Moist oral mucosa  NECK: Supple  RESPIRATORY: Normal respiratory effort; lungs are clear to auscultation bilaterally  CARDIOVASCULAR: Regular rate and rhythm, normal S1 and S2, no murmur/rub/gallop; No lower extremity edema  ABDOMEN: Soft, Nondistended, Nontender to palpation, normoactive bowel sounds  MUSCULOSKELETAL:  No clubbing or cyanosis of digits; no joint swelling or tenderness to palpation  PSYCH: A+O to person, place, and time; affect appropriate  NEUROLOGY: no gross sensory deficits, +left sided weakness LUE>LLE (old CVA)      COORDINATION OF CARE:  Care Discussed with Consultants/Other Providers:  GENEVA re: dispo planning

## 2022-01-31 NOTE — PROGRESS NOTE ADULT - PROBLEM SELECTOR PLAN 9
DVT ppx: Eliquis  Dispo: [facility] (CM aware patient remains medically stable for discharge. Pending placement.)

## 2022-01-31 NOTE — PROGRESS NOTE ADULT - PROBLEM SELECTOR PLAN 2
- patient refusing lovenox, discussed with vasc cards attending - switched to Eliquis  - start eliquis 10mg BID x 7 days followed by 5mg BID   - LLE Duplex showing acute L distal femoral and popliteal veins and thrombosis of L popliteal artery  - Vascular Cardiology consult appreciated  - L arterial duplex confirming PAD  - per Vasc Cards, CT showing evidence of IVC filter in place  - podiatry consult recs appreciated  - discussed with Dr. Burciaga, no plans for inpatient intervention, but will need to follow-up with Vascular Cardiology (Dr. Burciaga) to discuss revascularizatoin as outpatient - her nephew Zeferino is aware and will ensure he takes her to this follow-up

## 2022-01-31 NOTE — PROGRESS NOTE ADULT - PROBLEM SELECTOR PLAN 3
asymptomatic and Ca<12  - improved s/p IV hydration  - iPTH elevated, with low vit D 25-OH. suspect primary hyperparathyroidism with concomitant low vit D  - 24 hr urinary calcium wnl  - can check parathyroid US as outpatient - nephew aware  - SPEP, UPEP, immunofixation WNL

## 2022-01-31 NOTE — PROGRESS NOTE ADULT - PROBLEM SELECTOR PLAN 1
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 BP uncontrolled. patient refusing some of her antihypertensive medications episodically and BP checks     - c/w labetalol 100mg BID (hold for bradycardia)  - c/w amlodipine 10mg daily  - c/w losartan to 100mg daily  - TTE unremarkable  - Partially thrombosed infrarenal AAA 3.4 cm - outpt follow up  - advised smoking cessation

## 2022-01-31 NOTE — PROGRESS NOTE ADULT - ASSESSMENT
70 y/o male pt with left 2nd toe ischemic changes   - pt seen and evaluated  - left 2nd toe ischemic changes with eschar distally  - rec betadine and leaving open to air daily  - pt will need further vascular work up    - toenails debrided x10 using sterile nippers  - all offending ingrowing nail boarders excised   - patient educated on proper foot care and importance of regular examinations   - recommend z flow boots at all times while in bed   - will monitor for further changes during this admission

## 2022-02-01 LAB
GLUCOSE BLDC GLUCOMTR-MCNC: 188 MG/DL — HIGH (ref 70–99)
GLUCOSE BLDC GLUCOMTR-MCNC: 210 MG/DL — HIGH (ref 70–99)
GLUCOSE BLDC GLUCOMTR-MCNC: 223 MG/DL — HIGH (ref 70–99)
GLUCOSE BLDC GLUCOMTR-MCNC: 246 MG/DL — HIGH (ref 70–99)
GLUCOSE BLDC GLUCOMTR-MCNC: 251 MG/DL — HIGH (ref 70–99)

## 2022-02-01 PROCEDURE — 99232 SBSQ HOSP IP/OBS MODERATE 35: CPT

## 2022-02-01 RX ORDER — ATORVASTATIN CALCIUM 80 MG/1
1 TABLET, FILM COATED ORAL
Qty: 30 | Refills: 0
Start: 2022-02-01 | End: 2022-03-02

## 2022-02-01 RX ORDER — ALBUTEROL 90 UG/1
2 AEROSOL, METERED ORAL
Qty: 360 | Refills: 0
Start: 2022-02-01 | End: 2022-03-02

## 2022-02-01 RX ORDER — LABETALOL HCL 100 MG
1 TABLET ORAL
Qty: 60 | Refills: 0
Start: 2022-02-01 | End: 2022-03-02

## 2022-02-01 RX ORDER — SOD,AMMONIUM,POTASSIUM LACTATE
1 CREAM (GRAM) TOPICAL
Qty: 60 | Refills: 0
Start: 2022-02-01 | End: 2022-03-02

## 2022-02-01 RX ORDER — AMLODIPINE BESYLATE 2.5 MG/1
1 TABLET ORAL
Qty: 30 | Refills: 0
Start: 2022-02-01 | End: 2022-03-02

## 2022-02-01 RX ORDER — LOSARTAN POTASSIUM 100 MG/1
1 TABLET, FILM COATED ORAL
Qty: 30 | Refills: 0
Start: 2022-02-01 | End: 2022-03-02

## 2022-02-01 RX ORDER — APIXABAN 2.5 MG/1
1 TABLET, FILM COATED ORAL
Qty: 180 | Refills: 0
Start: 2022-02-01 | End: 2022-05-01

## 2022-02-01 RX ADMIN — Medication 1 APPLICATION(S): at 17:54

## 2022-02-01 RX ADMIN — Medication 81 MILLIGRAM(S): at 11:52

## 2022-02-01 RX ADMIN — APIXABAN 5 MILLIGRAM(S): 2.5 TABLET, FILM COATED ORAL at 17:54

## 2022-02-01 RX ADMIN — Medication 1 APPLICATION(S): at 06:38

## 2022-02-01 RX ADMIN — Medication 100 MILLIGRAM(S): at 17:54

## 2022-02-01 RX ADMIN — Medication 100 MILLIGRAM(S): at 06:37

## 2022-02-01 RX ADMIN — LOSARTAN POTASSIUM 100 MILLIGRAM(S): 100 TABLET, FILM COATED ORAL at 06:37

## 2022-02-01 RX ADMIN — SENNA PLUS 2 TABLET(S): 8.6 TABLET ORAL at 21:46

## 2022-02-01 RX ADMIN — Medication 2: at 07:57

## 2022-02-01 RX ADMIN — Medication 1: at 11:54

## 2022-02-01 RX ADMIN — APIXABAN 10 MILLIGRAM(S): 2.5 TABLET, FILM COATED ORAL at 06:40

## 2022-02-01 RX ADMIN — AMLODIPINE BESYLATE 10 MILLIGRAM(S): 2.5 TABLET ORAL at 06:38

## 2022-02-01 RX ADMIN — APIXABAN 5 MILLIGRAM(S): 2.5 TABLET, FILM COATED ORAL at 06:37

## 2022-02-01 RX ADMIN — ATORVASTATIN CALCIUM 40 MILLIGRAM(S): 80 TABLET, FILM COATED ORAL at 21:46

## 2022-02-01 NOTE — PROGRESS NOTE ADULT - PROBLEM SELECTOR PROBLEM 2
Arterial thrombosis
2019 novel coronavirus disease (COVID-19)
Arterial thrombosis
Arterial thrombosis
2019 novel coronavirus disease (COVID-19)
Arterial thrombosis
Arterial thrombosis

## 2022-02-01 NOTE — PROGRESS NOTE ADULT - REASON FOR ADMISSION
AMS, r/o dissection

## 2022-02-01 NOTE — PROGRESS NOTE ADULT - PROBLEM SELECTOR PLAN 5
left residual arm hemiparesis  - c/w ASA 81mg daily, atorvastatin 40mg qHS
left residual arm hemiparesis  - c/w ASA 81mg daily, atorvastatin 40mg qHS
asymptomatic and Ca<12  - improved s/p IV hydration  - f/u iPTH, 1,25(OH)VitD, 25(OH)VitD, SPEP, UPEP
left residual arm hemiparesis  - c/w ASA 81mg daily, atorvastatin 40mg qHS
left residual arm hemiparesis  - start ASA 81mg daily, atorvastatin 40mg qHS
left residual arm hemiparesis  - c/w ASA 81mg daily, atorvastatin 40mg qHS
asymptomatic and ca<12  -c/w IV hydration ordered for another 10 hrs 1/21-  -f/u iPTH, 1,25(OH)VitD, 25(OH)VitD, SPEP, UPEP - ordered again today as stat
left residual arm hemiparesis  - c/w ASA 81mg daily, atorvastatin 40mg qHS

## 2022-02-01 NOTE — PROGRESS NOTE ADULT - PROBLEM SELECTOR PROBLEM 4
2019 novel coronavirus disease (COVID-19)
Acute UTI
Acute UTI
2019 novel coronavirus disease (COVID-19)

## 2022-02-01 NOTE — PHARMACOTHERAPY INTERVENTION NOTE - COMMENTS
Pt getting ready for discharge home vs [facility], pending nephew Zeferino's decision. Counseled Zeferino on the following discharge medications names (brand/generic), indication, and possible side effects over the phone:   amLODIPine 10 mg oral tablet 1 tab(s) orally once a day     ammonium lactate 12% topical lotion 1 application topically 2 times a day     apixaban 5 mg oral tablet 1 tab(s) orally every 12 hours     aspirin 81 mg oral tablet, chewable 1 tab(s) orally once a day     atorvastatin 40 mg oral tablet 1 tab(s) orally once a day (at bedtime)     labetalol 100 mg oral tablet 1 tab(s) orally every 12 hours     losartan 100 mg oral tablet 1 tab(s) orally once a day     metFORMIN 500 mg oral tablet 1 tab(s) orally 2 times a day      Stressed importance on BP medications and need to be compliant and check BP once outpatient. Encourage to purchase a BP machine for at home use, follow up with PCP for regular HTN management, and/or to go to drugstore to check BP with pharmacist aid to ensure proper technique. Counseled on apixaban indication and side effects. Patient's nephews questions and concerns were answered and addressed.     Tish Henderson, PharmD, BCPS  Clinical Pharmacy Specialist  [phone] or Teams

## 2022-02-01 NOTE — PROGRESS NOTE ADULT - PROBLEM SELECTOR PLAN 8
HbA1c 9.2%. poorly uncontrolled.  - c/w sliding scale  - will add basal/bolus if needed  - will need to discharge home on oral regimen likely metformin
HbA1c 9.2%. poorly uncontrolled.  - c/w sliding scale  - will add basal/bolus if needed  - will need to discharge home on oral regimen likely metformin 500mg BID
HbA1c 9.2%. poorly uncontrolled.  - c/w sliding scale  - will add basal/bolus if needed  - will need to discharge home on oral regimen likely metformin
HbA1c 9.2%. poorly uncontrolled.  - c/w sliding scale  - will add basal/bolus if needed  - will need to discharge home on oral regimen likely metformin 500mg BID
HbA1c 9.2%. poorly uncontrolled.  - c/w sliding scale  - will add basal/bolus if needed  - will need to discharge home on oral regimen likely metformin
DVT ppx: therapeutic lovenox given elevated d-dimer  PT eval  Dispo: pending improvement in BP, f/u CTA and dopplers, PT eval, TTE and final CTS recs
HbA1c 9.2%. poorly uncontrolled.  - c/w sliding scale  - will add basal/bolus if needed  - will need to discharge home on oral regimen likely metformin

## 2022-02-01 NOTE — DISCHARGE NOTE NURSING/CASE MANAGEMENT/SOCIAL WORK - NSDCPEELIQUISCOMP_GEN_ALL_CORE
Apixaban/Eliquis is used to treat and prevent blood clots. If you are not able to swallow the tablets whole, they may be crushed and mixed in water, apple juice, or applesauce and promptly taken within four hours. Never skip a dose of Apixaban/Eliquis. If you forget to take your Apixaban/Eliquis, take a dose as soon as you remember. If it is almost time for your next Apixaban/Eliquis dose, wait until then and take a regular dose. DO NOT take an extra pill to ‘catch up’.  NEVER TAKE A DOUBLE DOSE. Notify your doctor that you missed a dose. Take Apixaban/Eliquis at the same time each morning and evening. Apixaban/Eliquis may be taken with other medication or food. Spontaneous, unlabored and symmetrical

## 2022-02-01 NOTE — PROGRESS NOTE ADULT - SUBJECTIVE AND OBJECTIVE BOX
[facility] Division of Hospital Medicine  Prasad Taylor MD, NAYLA  I'm reachable on [employer] Teams   Off hours:  [phone] ([facility] pager)    Patient is a 71y old  Female who presents with a chief complaint of AMS, r/o dissection (31 Jan 2022 20:54)      SUBJECTIVE / OVERNIGHT EVENTS:  No events overnight. No new complaints.     MEDICATIONS  (STANDING):  amLODIPine   Tablet 10 milliGRAM(s) Oral daily  ammonium lactate 12% Lotion 1 Application(s) Topical two times a day  apixaban 5 milliGRAM(s) Oral every 12 hours  aspirin  chewable 81 milliGRAM(s) Oral daily  atorvastatin 40 milliGRAM(s) Oral at bedtime  dextrose 40% Gel 15 Gram(s) Oral once  dextrose 5%. 1000 milliLiter(s) (100 mL/Hr) IV Continuous <Continuous>  dextrose 50% Injectable 25 Gram(s) IV Push once  glucagon  Injectable 1 milliGRAM(s) IntraMuscular once  insulin lispro (ADMELOG) corrective regimen sliding scale   SubCutaneous three times a day before meals  labetalol 100 milliGRAM(s) Oral every 12 hours  losartan 100 milliGRAM(s) Oral daily  nicotine - 21 mG/24Hr(s) Patch 1 patch Transdermal daily  polyethylene glycol 3350 17 Gram(s) Oral daily  potassium chloride    Tablet ER 40 milliEquivalent(s) Oral once  senna 2 Tablet(s) Oral at bedtime    MEDICATIONS  (PRN):  acetaminophen     Tablet .. 650 milliGRAM(s) Oral every 6 hours PRN Temp greater or equal to 38C (100.4F), Mild Pain (1 - 3)  acetaminophen  Suppository .. 650 milliGRAM(s) Rectal every 4 hours PRN Temp greater or equal to 38.5C (101.3F)  ALBUTerol    90 MICROgram(s) HFA Inhaler 2 Puff(s) Inhalation every 4 hours PRN Shortness of Breath and/or Wheezing  aluminum hydroxide/magnesium hydroxide/simethicone Suspension 30 milliLiter(s) Oral every 4 hours PRN Dyspepsia  benzonatate 100 milliGRAM(s) Oral three times a day PRN Cough  guaifenesin/dextromethorphan Oral Liquid 10 milliLiter(s) Oral every 4 hours PRN Cough  melatonin 3 milliGRAM(s) Oral at bedtime PRN Insomnia  ondansetron Injectable 4 milliGRAM(s) IV Push every 8 hours PRN Nausea and/or Vomiting    CAPILLARY BLOOD GLUCOSE      POCT Blood Glucose.: 188 mg/dL (01 Feb 2022 11:38)  POCT Blood Glucose.: 210 mg/dL (01 Feb 2022 07:37)  POCT Blood Glucose.: 296 mg/dL (31 Jan 2022 22:33)  POCT Blood Glucose.: 188 mg/dL (31 Jan 2022 17:12)    I&O's Summary    31 Jan 2022 07:01  -  01 Feb 2022 07:00  --------------------------------------------------------  IN: 420 mL / OUT: 450 mL / NET: -30 mL    01 Feb 2022 07:01  -  01 Feb 2022 13:37  --------------------------------------------------------  IN: 240 mL / OUT: 0 mL / NET: 240 mL        PHYSICAL EXAM:  Vital Signs Last 24 Hrs  T(C): 37.2 (01 Feb 2022 10:45), Max: 37.2 (01 Feb 2022 10:45)  T(F): 99 (01 Feb 2022 10:45), Max: 99 (01 Feb 2022 10:45)  HR: 64 (01 Feb 2022 10:45) (57 - 64)  BP: 121/67 (01 Feb 2022 10:45) (121/67 - 128/65)  BP(mean): --  RR: 18 (01 Feb 2022 10:45) (18 - 18)  SpO2: 97% (01 Feb 2022 10:45) (96% - 97%)    CONSTITUTIONAL: NAD, well-developed  EYES: EOMI; conjunctiva and sclera clear  ENMT: Moist oral mucosa  NECK: Supple  RESPIRATORY: Normal respiratory effort; lungs are clear to auscultation bilaterally  CARDIOVASCULAR: Regular rate and rhythm, normal S1 and S2, no murmur/rub/gallop; No lower extremity edema  ABDOMEN: Soft, Nondistended, Nontender to palpation, normoactive bowel sounds  MUSCULOSKELETAL:  No clubbing or cyanosis of digits; no joint swelling or tenderness to palpation  PSYCH: A+O to person, place, and time; affect appropriate  NEUROLOGY: no gross sensory deficits, +left sided weakness LUE>LLE (old CVA)    COORDINATION OF CARE:  Care Discussed with Consultants/Other Providers:  GENEVA re: dispo planning

## 2022-02-01 NOTE — PROGRESS NOTE ADULT - PROBLEM/PLAN-8
DISPLAY PLAN FREE TEXT
DISPLAY PLAN FREE TEXT
142
DISPLAY PLAN FREE TEXT

## 2022-02-01 NOTE — PROGRESS NOTE ADULT - PROVIDER SPECIALTY LIST ADULT
Vascular Cardiology
Vascular Cardiology
Podiatry
Vascular Cardiology
Hospitalist

## 2022-02-01 NOTE — PROGRESS NOTE ADULT - ASSESSMENT
70 yo F PMH DM2, ?cognitive impairment, CVA w/ residual L arm weakness tx from [facility] with for CTS eval of thoracic aortic aneurysm evaluation now medically managing, found to have UTI, COVID+, elevated d-dimer, hypercalcemia, and acute L distal femoral and popliteal veins and thrombosis of L popliteal artery for which she will follow-up with Vascular Cardiology as outpatient. Family changed their minds about rehab, CM currently working on dispo to home.

## 2022-02-01 NOTE — PROGRESS NOTE ADULT - PROBLEM SELECTOR PROBLEM 8
Type 2 diabetes mellitus
Prophylactic measure
Type 2 diabetes mellitus

## 2022-02-01 NOTE — DISCHARGE NOTE NURSING/CASE MANAGEMENT/SOCIAL WORK - PATIENT PORTAL LINK FT
You can access the FollowMyHealth Patient Portal offered by [facility] by registering at the following website: [email or web address]. By joining [employer]’s FollowMyHealth portal, you will also be able to view your health information using other applications (apps) compatible with our system.

## 2022-02-01 NOTE — PROGRESS NOTE ADULT - PROBLEM SELECTOR PROBLEM 3
Hypercalcemia
Cerebrovascular accident (CVA)
Cerebrovascular accident (CVA)
Hypercalcemia

## 2022-02-01 NOTE — PROGRESS NOTE ADULT - PROBLEM SELECTOR PROBLEM 6
Acute UTI
ALDA (acute kidney injury)
ALDA (acute kidney injury)
Acute UTI

## 2022-02-01 NOTE — PROGRESS NOTE ADULT - PROBLEM SELECTOR PROBLEM 1
Thoracic aortic aneurysm

## 2022-02-01 NOTE — DISCHARGE NOTE NURSING/CASE MANAGEMENT/SOCIAL WORK - NSDCPEFALRISK_GEN_ALL_CORE
For information on Fall & Injury Prevention, visit: [email or web address] OR  [email or web address] OR  https://www.cdc.gov/steadi/patient.html

## 2022-02-01 NOTE — PROGRESS NOTE ADULT - PROBLEM SELECTOR PROBLEM 5
Cerebrovascular accident (CVA)
Hypercalcemia
Cerebrovascular accident (CVA)
Hypercalcemia
Cerebrovascular accident (CVA)

## 2022-02-02 VITALS
DIASTOLIC BLOOD PRESSURE: 70 MMHG | HEART RATE: 67 BPM | SYSTOLIC BLOOD PRESSURE: 149 MMHG | RESPIRATION RATE: 18 BRPM | TEMPERATURE: 99 F | OXYGEN SATURATION: 95 %

## 2022-02-02 LAB
GLUCOSE BLDC GLUCOMTR-MCNC: 202 MG/DL — HIGH (ref 70–99)
GLUCOSE BLDC GLUCOMTR-MCNC: 207 MG/DL — HIGH (ref 70–99)
SARS-COV-2 RNA SPEC QL NAA+PROBE: SIGNIFICANT CHANGE UP

## 2022-02-02 PROCEDURE — 83970 ASSAY OF PARATHORMONE: CPT

## 2022-02-02 PROCEDURE — 82435 ASSAY OF BLOOD CHLORIDE: CPT

## 2022-02-02 PROCEDURE — 93005 ELECTROCARDIOGRAM TRACING: CPT

## 2022-02-02 PROCEDURE — 72170 X-RAY EXAM OF PELVIS: CPT

## 2022-02-02 PROCEDURE — U0005: CPT

## 2022-02-02 PROCEDURE — 83519 RIA NONANTIBODY: CPT

## 2022-02-02 PROCEDURE — 87086 URINE CULTURE/COLONY COUNT: CPT

## 2022-02-02 PROCEDURE — 85025 COMPLETE CBC W/AUTO DIFF WBC: CPT

## 2022-02-02 PROCEDURE — 85018 HEMOGLOBIN: CPT

## 2022-02-02 PROCEDURE — 71275 CT ANGIOGRAPHY CHEST: CPT

## 2022-02-02 PROCEDURE — 82803 BLOOD GASES ANY COMBINATION: CPT

## 2022-02-02 PROCEDURE — 93321 DOPPLER ECHO F-UP/LMTD STD: CPT

## 2022-02-02 PROCEDURE — 86901 BLOOD TYPING SEROLOGIC RH(D): CPT

## 2022-02-02 PROCEDURE — U0003: CPT

## 2022-02-02 PROCEDURE — 80048 BASIC METABOLIC PNL TOTAL CA: CPT

## 2022-02-02 PROCEDURE — 82550 ASSAY OF CK (CPK): CPT

## 2022-02-02 PROCEDURE — 83605 ASSAY OF LACTIC ACID: CPT

## 2022-02-02 PROCEDURE — 93308 TTE F-UP OR LMTD: CPT

## 2022-02-02 PROCEDURE — 99239 HOSP IP/OBS DSCHRG MGMT >30: CPT

## 2022-02-02 PROCEDURE — 86900 BLOOD TYPING SEROLOGIC ABO: CPT

## 2022-02-02 PROCEDURE — 82340 ASSAY OF CALCIUM IN URINE: CPT

## 2022-02-02 PROCEDURE — 96375 TX/PRO/DX INJ NEW DRUG ADDON: CPT

## 2022-02-02 PROCEDURE — 86334 IMMUNOFIX E-PHORESIS SERUM: CPT

## 2022-02-02 PROCEDURE — 83880 ASSAY OF NATRIURETIC PEPTIDE: CPT

## 2022-02-02 PROCEDURE — 74174 CTA ABD&PLVS W/CONTRAST: CPT

## 2022-02-02 PROCEDURE — 82570 ASSAY OF URINE CREATININE: CPT

## 2022-02-02 PROCEDURE — 86803 HEPATITIS C AB TEST: CPT

## 2022-02-02 PROCEDURE — 36415 COLL VENOUS BLD VENIPUNCTURE: CPT

## 2022-02-02 PROCEDURE — 97162 PT EVAL MOD COMPLEX 30 MIN: CPT

## 2022-02-02 PROCEDURE — 73552 X-RAY EXAM OF FEMUR 2/>: CPT

## 2022-02-02 PROCEDURE — 85027 COMPLETE CBC AUTOMATED: CPT

## 2022-02-02 PROCEDURE — 85014 HEMATOCRIT: CPT

## 2022-02-02 PROCEDURE — 86850 RBC ANTIBODY SCREEN: CPT

## 2022-02-02 PROCEDURE — 82962 GLUCOSE BLOOD TEST: CPT

## 2022-02-02 PROCEDURE — 93970 EXTREMITY STUDY: CPT

## 2022-02-02 PROCEDURE — 73630 X-RAY EXAM OF FOOT: CPT

## 2022-02-02 PROCEDURE — 82553 CREATINE MB FRACTION: CPT

## 2022-02-02 PROCEDURE — 82947 ASSAY GLUCOSE BLOOD QUANT: CPT

## 2022-02-02 PROCEDURE — 96374 THER/PROPH/DIAG INJ IV PUSH: CPT | Mod: XU

## 2022-02-02 PROCEDURE — 82310 ASSAY OF CALCIUM: CPT

## 2022-02-02 PROCEDURE — 82607 VITAMIN B-12: CPT

## 2022-02-02 PROCEDURE — 84300 ASSAY OF URINE SODIUM: CPT

## 2022-02-02 PROCEDURE — 84165 PROTEIN E-PHORESIS SERUM: CPT

## 2022-02-02 PROCEDURE — 82728 ASSAY OF FERRITIN: CPT

## 2022-02-02 PROCEDURE — 84132 ASSAY OF SERUM POTASSIUM: CPT

## 2022-02-02 PROCEDURE — 80053 COMPREHEN METABOLIC PANEL: CPT

## 2022-02-02 PROCEDURE — 97116 GAIT TRAINING THERAPY: CPT

## 2022-02-02 PROCEDURE — 85730 THROMBOPLASTIN TIME PARTIAL: CPT

## 2022-02-02 PROCEDURE — 84484 ASSAY OF TROPONIN QUANT: CPT

## 2022-02-02 PROCEDURE — 85379 FIBRIN DEGRADATION QUANT: CPT

## 2022-02-02 PROCEDURE — 97530 THERAPEUTIC ACTIVITIES: CPT

## 2022-02-02 PROCEDURE — 93926 LOWER EXTREMITY STUDY: CPT

## 2022-02-02 PROCEDURE — 82565 ASSAY OF CREATININE: CPT

## 2022-02-02 PROCEDURE — 83036 HEMOGLOBIN GLYCOSYLATED A1C: CPT

## 2022-02-02 PROCEDURE — 87040 BLOOD CULTURE FOR BACTERIA: CPT

## 2022-02-02 PROCEDURE — 84100 ASSAY OF PHOSPHORUS: CPT

## 2022-02-02 PROCEDURE — 93923 UPR/LXTR ART STDY 3+ LVLS: CPT

## 2022-02-02 PROCEDURE — 82652 VIT D 1 25-DIHYDROXY: CPT

## 2022-02-02 PROCEDURE — 86140 C-REACTIVE PROTEIN: CPT

## 2022-02-02 PROCEDURE — 83735 ASSAY OF MAGNESIUM: CPT

## 2022-02-02 PROCEDURE — 87635 SARS-COV-2 COVID-19 AMP PRB: CPT

## 2022-02-02 PROCEDURE — 84155 ASSAY OF PROTEIN SERUM: CPT

## 2022-02-02 PROCEDURE — 85610 PROTHROMBIN TIME: CPT

## 2022-02-02 PROCEDURE — 82330 ASSAY OF CALCIUM: CPT

## 2022-02-02 PROCEDURE — 84133 ASSAY OF URINE POTASSIUM: CPT

## 2022-02-02 PROCEDURE — 82306 VITAMIN D 25 HYDROXY: CPT

## 2022-02-02 PROCEDURE — 81001 URINALYSIS AUTO W/SCOPE: CPT

## 2022-02-02 PROCEDURE — 99291 CRITICAL CARE FIRST HOUR: CPT | Mod: 25

## 2022-02-02 PROCEDURE — 84295 ASSAY OF SERUM SODIUM: CPT

## 2022-02-02 PROCEDURE — 87186 SC STD MICRODIL/AGAR DIL: CPT

## 2022-02-02 RX ORDER — METFORMIN HYDROCHLORIDE 850 MG/1
1 TABLET ORAL
Qty: 0 | Refills: 0 | DISCHARGE

## 2022-02-02 RX ADMIN — Medication 2: at 08:06

## 2022-02-02 RX ADMIN — Medication 1 APPLICATION(S): at 05:59

## 2022-02-02 RX ADMIN — Medication 1 PATCH: at 06:05

## 2022-02-02 RX ADMIN — AMLODIPINE BESYLATE 10 MILLIGRAM(S): 2.5 TABLET ORAL at 05:59

## 2022-02-02 RX ADMIN — APIXABAN 5 MILLIGRAM(S): 2.5 TABLET, FILM COATED ORAL at 05:59

## 2022-02-02 RX ADMIN — Medication 1 PATCH: at 07:37

## 2022-02-02 RX ADMIN — LOSARTAN POTASSIUM 100 MILLIGRAM(S): 100 TABLET, FILM COATED ORAL at 05:59

## 2022-02-02 RX ADMIN — Medication 100 MILLIGRAM(S): at 06:05

## 2022-02-02 NOTE — CHART NOTE - NSCHARTNOTEFT_GEN_A_CORE
Called by RN to see pt at bedside for abd discomfort. Pt seen at bedside states that she's having gas pain. Pain started after eating dinner. Last BM per pt yesterday. + BS. Simethicone ordered. Pt requesting ginger ale. Will continue to monitor.    Amber Estrada, MARII-c  [phone]
Discussed with Dr. Romeo and labetalol 100mg TID ordered for htn with parameters (Hold for SBP less than 100 and HR less than 55).    Amber Estrada, NP-c  [phone]
Medicine Attending - Discharge Day Note:  ================================================    Transferred from [facility] for "symptomatic AAA."    # Sepsis secondary to acute UTI present on admission  # 2019 novel coronavirus disease (COVID-19)  # Thoracic aortic aneurysm  # Arterial thrombosis and acute DVT - acute L distal femoral and popliteal veins and thrombosis of L popliteal artery  # hypertension  # Hypercalcemia  # h/o Cerebrovascular accident (CVA) with left arm hemiparesis   # ALDA 2/2 prerenal azotemia   # Type 2 diabetes mellitus    The patient is medically stable for discharge to [facility]. Last couple of days the family was going back and forth between home and rehab (requiring auth), thus the delay in transition of care.     Discharge time spent 35 minutes.     Prasad Taylor MD, NAYLA  Available on [employer] Teams
Seen at bedside, AAOx2, speaks english and Serbian, states she wants to go home, does not like the food here, no n/v/f/chills, cp, sob, abd pain. Explained reason for hospitalization to the patient.  VS reviewed, satting 95% on RA, BP up to 170s, afebrile.  PE Disheveled F, lying in bed, nad, abd sntnd, legs without edema.    A/P:   Thoracic aortic aneurysm - medically managing, UTI, COVID+ not requiring O2, Hypercalcemia  - CTS following - F/U TTE ordered  - incr labetalol to 100 tid - cannot titrate too much given mild bradycardia  - add norvasc - goal SBP~120 or less  - IVF for hypercalcemia, f/u lab workup - may need bisphosphonate, monitor lactate  - d/c decadron given pt not on supplemental O2. c/w remdesivir while in patient as she has risk factors for progression of COVID pna.   - kathleen vs ckd - CT reviewed without hydronephrosis or staghorn calculus as mentioned in HPI. trend UOP  - C/W ceftriaxone for UTI, f/u ucx  - check d-dimer/ferritin/CRP - depending on d-dimer and weight, may need to switch DVT ppx - currently hep sc  - trend lactate    called pt's sister Joel over phone who prefer I speak to her son / Zeferino [phone], updated over the phone  d/w ACP Amber
MEDICINE PA EPISODIC NOTE    Notified by RN of pt. refusing BP meds and vital signs. Pt. seen and examined at bedside, communicated using  (ID#[id number]), AOx2. Pt. states she wants to speak to her doctor and go home. Explained risks vs. benefits of refusing BP meds and pt. acknowledges it. Pt. states she wants to only take her BP meds with breakfast. Will continue to closely monitor and endorse to day team in AM.    Deidra Abrams PA-C  Dept. of Medicine  [place] [phone]

## 2022-02-03 PROBLEM — Z00.00 ENCOUNTER FOR PREVENTIVE HEALTH EXAMINATION: Status: ACTIVE | Noted: 2022-02-03

## 2022-04-06 NOTE — DISCHARGE NOTE NURSING/CASE MANAGEMENT/SOCIAL WORK - NSDPLANG ASIS_GEN_ALL_CORE
Disp  Refills  Start  End  WHITNEY    clonazePAM (KLONOPIN) 0.5 MG tablet  90 tablet  5  5/4/2020   No    Sig: TAKE 1 TABLET BY MOUTH THREE TIMES DAILY AS NEEDED      Denying request as this was a duplicate during an E-visit by provider. Sandra Holt RN  ....................  5/4/2020   3:27 PM      
No
Cheilitis Aggressive Treatment: I recommended application of Vaseline or Aquaphor numerous times a day (as often as every hour) and before going to bed. I also prescribed a topical steroid for twice daily use.

## 2022-10-13 NOTE — ED CLERICAL - NS ED CLERK UNITS
APER [de-identified] : Went to a neurologist w/ dizziness a year ago and had a VNG that reportedly showed a peripheral source (report n/a). She now comes in with 2 months of unsteadiness/disorientation and lightheadedness on subways, moving and after standing up. She reports that tilts were positive w/ her PCP and she's been liberalizing salt intake. \par She denies hearing loss or tinnitus. \par Anemic d/t UC.

## 2022-10-29 NOTE — ED CLERICAL - DIVISION
Drink lots of fluids.  Encourage oral fluids.  Tylenol or ibuprofen for fever.  Return to emergency department for worsening symptoms or any problems  
[facility]...

## 2023-04-08 NOTE — DIETITIAN INITIAL EVALUATION ADULT. - LAB (SPECIFY)
- - - Basic metabolic panel, daily BG, consider obtaining repeat Vitamin D 25OH level if replenished, Vitamin B12, Folate

## 2024-05-07 NOTE — PROGRESS NOTE ADULT - PROBLEM SELECTOR PLAN 9
DVT ppx: therapeutic lovenox given elevated d-dimer and VTE    Dispo: PT pending. per nephew, patient lives alone and ambulates around her apartment with cane no DVT ppx: therapeutic lovenox given DVT  Dispo: PT pending. per nephew, patient lives alone and ambulates around her apartment with cane

## 2025-06-14 NOTE — CONSULT NOTE ADULT - SUBJECTIVE AND OBJECTIVE BOX
Vascular Cardiology Consult Note    DIRECT SERVICE NUMBER:  [phone]           EMAIL [email or web address]   OFFICE [phone]    CC:  Transferred from [place] for management of thoracic aneurysm     HPI:    71 F transferred from [facility] for management of thoracic aneurysm found to have covid PNA. Pt seen by CTS and deemed no emergent need for surgical intervention. Hospital course c/b LLE DVT and LLE arterial thrombus for which vascular cardiology is consulted. Pt at time of eval denies any chest pain, sob, or palpitations. Endorses pain in LLE.        Allergies    Allergy Status Unknown    Intolerances    	    MEDICATIONS:  amLODIPine   Tablet 10 milliGRAM(s) Oral daily  aspirin  chewable 81 milliGRAM(s) Oral daily  enoxaparin Injectable 60 milliGRAM(s) SubCutaneous two times a day  labetalol 100 milliGRAM(s) Oral three times a day  losartan 100 milliGRAM(s) Oral daily    remdesivir  IVPB   IV Intermittent   remdesivir  IVPB 100 milliGRAM(s) IV Intermittent every 24 hours    ALBUTerol    90 MICROgram(s) HFA Inhaler 2 Puff(s) Inhalation every 4 hours PRN  benzonatate 100 milliGRAM(s) Oral three times a day PRN  guaifenesin/dextromethorphan Oral Liquid 10 milliLiter(s) Oral every 4 hours PRN    acetaminophen     Tablet .. 650 milliGRAM(s) Oral every 6 hours PRN  acetaminophen  Suppository .. 650 milliGRAM(s) Rectal every 4 hours PRN  melatonin 3 milliGRAM(s) Oral at bedtime PRN  ondansetron Injectable 4 milliGRAM(s) IV Push every 8 hours PRN    aluminum hydroxide/magnesium hydroxide/simethicone Suspension 30 milliLiter(s) Oral every 4 hours PRN    atorvastatin 40 milliGRAM(s) Oral at bedtime  dextrose 40% Gel 15 Gram(s) Oral once  dextrose 50% Injectable 25 Gram(s) IV Push once  glucagon  Injectable 1 milliGRAM(s) IntraMuscular once  insulin lispro (ADMELOG) corrective regimen sliding scale   SubCutaneous three times a day before meals    dextrose 5%. 1000 milliLiter(s) IV Continuous <Continuous>  magnesium sulfate  IVPB 2 Gram(s) IV Intermittent once  potassium phosphate IVPB 30 milliMole(s) IV Intermittent once      PAST MEDICAL & SURGICAL HISTORY:  Staghorn calculus    HTN (hypertension)    Thoracic aortic aneurysm    Surgical history unknown        FAMILY HISTORY:  No pertinent family history in first degree relatives        SOCIAL HISTORY:  unchanged    REVIEW OF SYSTEMS:  CONSTITUTIONAL: No fever or fatigue  ENT:  No difficulty hearing, tinnitus, vertigo; No sinus or throat pain  NECK: No pain or stiffness  RESPIRATORY:  No dyspnea or cough  CARDIOVASCULAR:  No chest pain or palpitations   GASTROINTESTINAL: No abdominal or epigastric pain. No nausea, vomiting, or hematemesis; No diarrhea or constipation. No melena or hematochezia.  GENITOURINARY: No dysuria, frequency, hematuria, or incontinence  NEUROLOGICAL: No headaches, memory loss, loss of strength, numbness, or tremors  SKIN:   LYMPH Nodes: No enlarged glands  ENDOCRINE: No heat or cold intolerance; No hair loss  MUSCULOSKELETAL: No joint pain or swelling; No muscle, back, or extremity pain  PSYCHIATRIC: No depression, anxiety, mood swings, or difficulty sleeping  HEME/LYMPH: No easy bruising, or bleeding gums  ALLERGY AND IMMUNOLOGIC: No hives or eczema	    [ x] All others negative	  [ ] Unable to obtain    PHYSICAL EXAM:  T(C): 36.7 (01-24-22 @ 08:40), Max: 36.7 (01-24-22 @ 08:40)  HR: 74 (01-24-22 @ 08:40) (62 - 74)  BP: 175/100 (01-24-22 @ 08:40) (166/80 - 203/98)  RR: 18 (01-24-22 @ 08:40) (18 - 18)  SpO2: 97% (01-24-22 @ 08:40) (97% - 98%)  Wt(kg): --  I&O's Summary    23 Jan 2022 07:01  -  24 Jan 2022 07:00  --------------------------------------------------------  IN: 118 mL / OUT: 2100 mL / NET: -1982 mL    24 Jan 2022 07:01  -  24 Jan 2022 12:24  --------------------------------------------------------  IN: 240 mL / OUT: 0 mL / NET: 240 mL        Appearance:  	  HEENT:   Normal oral mucosa  Lymphatic: No lymphadenopathy  Cardiovascular: S1, S2, RRR  Respiratory: CTAB  Psychiatry:  normal affect   Gastrointestinal:  Soft, Non-tender, + BS	  Skin: No rashes, No ecchymoses, No cyanosis	  Neurologic:  non focal  Extremities:  No LE edema     Vascular Pulse Exam:  Right DP: [x]palpable []non-palpable []audible      Left DP :   [x]palpable []non-palpable []audible       Foot Exam:  L 2nd digit eschar       LABS:	 	      01-23    141  |  103  |  19  ----------------------------<  149<H>  3.7   |  25  |  0.76    Ca    11.2<H>      23 Jan 2022 11:25  Phos  1.9     01-23  Mg     1.7     01-23    TPro  7.2  /  Alb  x   /  TBili  x   /  DBili  x   /  AST  x   /  ALT  x   /  AlkPhos  x   01-23  TPro  7.7  /  Alb  3.8  /  TBili  0.3  /  DBili  x   /  AST  23  /  ALT  10  /  AlkPhos  80  01-23           Vascular Cardiology Consult Note    DIRECT SERVICE NUMBER:  [phone]           EMAIL [email or web address]   OFFICE [phone]    CC:  Transferred from [place] for management of thoracic aneurysm     HPI:    71 F transferred from [facility] for management of thoracic aneurysm found to have covid PNA. Pt seen by CTS and deemed no emergent need for surgical intervention. Hospital course c/b LLE DVT and LLE arterial thrombus for which vascular cardiology is consulted. Pt at time of eval denies any chest pain, sob, or palpitations. Endorses pain in LLE.        Allergies    Allergy Status Unknown    Intolerances    	    MEDICATIONS:  amLODIPine   Tablet 10 milliGRAM(s) Oral daily  aspirin  chewable 81 milliGRAM(s) Oral daily  enoxaparin Injectable 60 milliGRAM(s) SubCutaneous two times a day  labetalol 100 milliGRAM(s) Oral three times a day  losartan 100 milliGRAM(s) Oral daily    remdesivir  IVPB   IV Intermittent   remdesivir  IVPB 100 milliGRAM(s) IV Intermittent every 24 hours    ALBUTerol    90 MICROgram(s) HFA Inhaler 2 Puff(s) Inhalation every 4 hours PRN  benzonatate 100 milliGRAM(s) Oral three times a day PRN  guaifenesin/dextromethorphan Oral Liquid 10 milliLiter(s) Oral every 4 hours PRN    acetaminophen     Tablet .. 650 milliGRAM(s) Oral every 6 hours PRN  acetaminophen  Suppository .. 650 milliGRAM(s) Rectal every 4 hours PRN  melatonin 3 milliGRAM(s) Oral at bedtime PRN  ondansetron Injectable 4 milliGRAM(s) IV Push every 8 hours PRN    aluminum hydroxide/magnesium hydroxide/simethicone Suspension 30 milliLiter(s) Oral every 4 hours PRN    atorvastatin 40 milliGRAM(s) Oral at bedtime  dextrose 40% Gel 15 Gram(s) Oral once  dextrose 50% Injectable 25 Gram(s) IV Push once  glucagon  Injectable 1 milliGRAM(s) IntraMuscular once  insulin lispro (ADMELOG) corrective regimen sliding scale   SubCutaneous three times a day before meals    dextrose 5%. 1000 milliLiter(s) IV Continuous <Continuous>  magnesium sulfate  IVPB 2 Gram(s) IV Intermittent once  potassium phosphate IVPB 30 milliMole(s) IV Intermittent once      PAST MEDICAL & SURGICAL HISTORY:  Staghorn calculus    HTN (hypertension)    Thoracic aortic aneurysm    Surgical history unknown        FAMILY HISTORY:  No pertinent family history in first degree relatives        SOCIAL HISTORY:  unchanged    REVIEW OF SYSTEMS:  CONSTITUTIONAL: No fever or fatigue  ENT:  No difficulty hearing, tinnitus, vertigo; No sinus or throat pain  NECK: No pain or stiffness  RESPIRATORY:  No dyspnea or cough  CARDIOVASCULAR:  No chest pain or palpitations   GASTROINTESTINAL: No abdominal or epigastric pain. No nausea, vomiting, or hematemesis; No diarrhea or constipation. No melena or hematochezia.  GENITOURINARY: No dysuria, frequency, hematuria, or incontinence  NEUROLOGICAL: No headaches, memory loss, loss of strength, numbness, or tremors  LYMPH Nodes: No enlarged glands  ENDOCRINE: No heat or cold intolerance; No hair loss  MUSCULOSKELETAL: No joint pain or swelling; No muscle, back, or extremity pain  PSYCHIATRIC: No depression, anxiety, mood swings, or difficulty sleeping  HEME/LYMPH: No easy bruising, or bleeding gums  ALLERGY AND IMMUNOLOGIC: No hives or eczema	    [ x] All others negative	  [ ] Unable to obtain    PHYSICAL EXAM:  T(C): 36.7 (01-24-22 @ 08:40), Max: 36.7 (01-24-22 @ 08:40)  HR: 74 (01-24-22 @ 08:40) (62 - 74)  BP: 175/100 (01-24-22 @ 08:40) (166/80 - 203/98)  RR: 18 (01-24-22 @ 08:40) (18 - 18)  SpO2: 97% (01-24-22 @ 08:40) (97% - 98%)  Wt(kg): --  I&O's Summary    23 Jan 2022 07:01  -  24 Jan 2022 07:00  --------------------------------------------------------  IN: 118 mL / OUT: 2100 mL / NET: -1982 mL    24 Jan 2022 07:01  -  24 Jan 2022 12:24  --------------------------------------------------------  IN: 240 mL / OUT: 0 mL / NET: 240 mL        Appearance:  	  HEENT:   Normal oral mucosa  Lymphatic: No lymphadenopathy  Cardiovascular: S1, S2, RRR  Respiratory: CTAB  Psychiatry:  normal affect   Gastrointestinal:  Soft, Non-tender, + BS	  Skin: No rashes, No ecchymoses, No cyanosis	  Neurologic:  non focal  Extremities:  No LE edema     Vascular Pulse Exam:  Right DP: [x]palpable []non-palpable []audible      Left DP :   [ ]palpable [x]non-palpable []audible       Foot Exam:  L 2nd digit eschar       LABS:	 	      01-23    141  |  103  |  19  ----------------------------<  149<H>  3.7   |  25  |  0.76    Ca    11.2<H>      23 Jan 2022 11:25  Phos  1.9     01-23  Mg     1.7     01-23    TPro  7.2  /  Alb  x   /  TBili  x   /  DBili  x   /  AST  x   /  ALT  x   /  AlkPhos  x   01-23  TPro  7.7  /  Alb  3.8  /  TBili  0.3  /  DBili  x   /  AST  23  /  ALT  10  /  AlkPhos  80  01-23           <-- Click to add NO significant Past Surgical History